# Patient Record
Sex: FEMALE | Race: WHITE | Employment: OTHER | URBAN - METROPOLITAN AREA
[De-identification: names, ages, dates, MRNs, and addresses within clinical notes are randomized per-mention and may not be internally consistent; named-entity substitution may affect disease eponyms.]

---

## 2018-11-16 ENCOUNTER — HOSPITAL ENCOUNTER (EMERGENCY)
Age: 38
Discharge: HOME | End: 2018-11-16
Attending: EMERGENCY MEDICINE
Payer: MEDICARE

## 2018-11-16 VITALS
BODY MASS INDEX: 29.62 KG/M2 | TEMPERATURE: 97.9 F | SYSTOLIC BLOOD PRESSURE: 112 MMHG | RESPIRATION RATE: 16 BRPM | DIASTOLIC BLOOD PRESSURE: 83 MMHG | HEART RATE: 92 BPM | OXYGEN SATURATION: 100 % | WEIGHT: 200 LBS | HEIGHT: 69 IN

## 2018-11-16 DIAGNOSIS — K02.9 DENTAL CARIES: ICD-10-CM

## 2018-11-16 DIAGNOSIS — K04.01 PULPITIS: Primary | ICD-10-CM

## 2018-11-16 PROCEDURE — 74011250637 HC RX REV CODE- 250/637: Performed by: EMERGENCY MEDICINE

## 2018-11-16 PROCEDURE — 96374 THER/PROPH/DIAG INJ IV PUSH: CPT

## 2018-11-16 PROCEDURE — 74011250636 HC RX REV CODE- 250/636: Performed by: EMERGENCY MEDICINE

## 2018-11-16 PROCEDURE — 99284 EMERGENCY DEPT VISIT MOD MDM: CPT

## 2018-11-16 PROCEDURE — 74011000250 HC RX REV CODE- 250: Performed by: EMERGENCY MEDICINE

## 2018-11-16 PROCEDURE — 75810000283 HC INJECTION NERVE BLOCK

## 2018-11-16 RX ORDER — DIPHENHYDRAMINE HYDROCHLORIDE 50 MG/ML
50 INJECTION, SOLUTION INTRAMUSCULAR; INTRAVENOUS
Status: COMPLETED | OUTPATIENT
Start: 2018-11-16 | End: 2018-11-16

## 2018-11-16 RX ORDER — ACETAMINOPHEN 500 MG
1000 TABLET ORAL ONCE
Status: COMPLETED | OUTPATIENT
Start: 2018-11-16 | End: 2018-11-16

## 2018-11-16 RX ORDER — LIDOCAINE HYDROCHLORIDE 10 MG/ML
10 INJECTION, SOLUTION EPIDURAL; INFILTRATION; INTRACAUDAL; PERINEURAL ONCE
Status: COMPLETED | OUTPATIENT
Start: 2018-11-16 | End: 2018-11-16

## 2018-11-16 RX ORDER — IBUPROFEN 200 MG
200 TABLET ORAL
COMMUNITY
End: 2018-12-08

## 2018-11-16 RX ORDER — LIDOCAINE HYDROCHLORIDE 20 MG/ML
15 SOLUTION OROPHARYNGEAL ONCE
Status: COMPLETED | OUTPATIENT
Start: 2018-11-16 | End: 2018-11-16

## 2018-11-16 RX ADMIN — LIDOCAINE HYDROCHLORIDE 15 ML: 20 SOLUTION ORAL; TOPICAL at 09:49

## 2018-11-16 RX ADMIN — LIDOCAINE HYDROCHLORIDE 5 ML: 10 INJECTION, SOLUTION EPIDURAL; INFILTRATION; INTRACAUDAL; PERINEURAL at 09:50

## 2018-11-16 RX ADMIN — ACETAMINOPHEN 1000 MG: 500 TABLET, FILM COATED ORAL at 09:48

## 2018-11-16 RX ADMIN — DIPHENHYDRAMINE HYDROCHLORIDE 50 MG: 50 INJECTION, SOLUTION INTRAMUSCULAR; INTRAVENOUS at 09:49

## 2018-11-16 NOTE — ED PROVIDER NOTES
EMERGENCY DEPARTMENT HISTORY AND PHYSICAL EXAM 
 
9:12 AM 
 
 
Date: 11/16/2018 Patient Name: Darshana Berry History of Presenting Illness Chief Complaint Patient presents with  Dental Pain  Ear Pain History Provided By: Patient Additional History (Context): Darshana Berry is a 40 y.o. female with PMHx of Asthma, and Open-Heart surgery at 13years old (per patient) who presents with  worsening throbbing 10/10 left-sided facial pain onset 3 days ago. Patient reports associated symptoms of left-sided dental pain, radiating to the left ear and head. Reports the use of Tylenol, which provided no alleviation in her presentation. Last dose of Tylenol was 3 days ago. Patient reports the use of 3 bottles of Ibuprofen, containing 36 pills (200mg) in each bottle, which provided alleviation until 1 day ago. Reports last dose of Ibuprofen at 4AM today (4 tablets of 200mg). Reports hx of tooth extraction 8 years ago. Reports she has not seen a dentist secondary to financial reasons. States, \"I know there's a hole in it, I just can't see it. \" No other concerns were expressed at this time. PCP: None Current Outpatient Medications Medication Sig Dispense Refill  ibuprofen (MOTRIN IB) 200 mg tablet Take 200 mg by mouth. Past History Past Medical History: 
Past Medical History:  
Diagnosis Date  Asthma Past Surgical History: 
Past Surgical History:  
Procedure Laterality Date  CARDIAC SURG PROCEDURE UNLIST Family History: 
History reviewed. No pertinent family history. Social History: 
Social History Tobacco Use  Smoking status: Current Every Day Smoker  Smokeless tobacco: Never Used Substance Use Topics  Alcohol use: Not on file  Drug use: Not on file Allergies: 
No Known Allergies Review of Systems Review of Systems Constitutional: Negative for fever. HENT: Positive for dental problem and ear pain (left ). (+) Left facial pain Neurological: Positive for headaches. All other systems reviewed and are negative. Physical Exam  
 
Visit Vitals /83 (BP 1 Location: Right arm, BP Patient Position: At rest) Pulse 92 Temp 97.9 °F (36.6 °C) Resp 16 Ht 5' 9\" (1.753 m) Wt 90.7 kg (200 lb) LMP 10/23/2018 SpO2 100% BMI 29.53 kg/m² Physical Exam  
Constitutional: She is oriented to person, place, and time. She appears well-developed and well-nourished. No distress. HENT:  
Head: Normocephalic and atraumatic. Right Ear: Tympanic membrane normal.  
Left Ear: Tympanic membrane normal.  
Mouth/Throat: Mucous membranes are normal. Dental caries present. Pain over left maxilla Dental benji in tooth 15 Pain reproduced at tooth 15 by tapping No pain along the mandible No swelling of the angular mandible Eyes: Pupils are equal, round, and reactive to light. Neck: Normal range of motion. Neck supple. Cardiovascular: Normal rate, regular rhythm, normal heart sounds and intact distal pulses. Exam reveals no gallop and no friction rub. No murmur heard. 2+ radial pulses bilaterally. No BLE edema. Pulmonary/Chest: Effort normal. No respiratory distress. She has wheezes. Coarse breath sounds Bilateral expiratory wheezes Abdominal: Soft. Bowel sounds are normal. She exhibits no distension. There is no tenderness. Musculoskeletal: Normal range of motion. Lymphadenopathy:  
No lymphadenopathy. Neurological: She is alert and oriented to person, place, and time. No cranial nerve deficit. Skin: Skin is warm and dry. No skin changes Nursing note and vitals reviewed. Diagnostic Study Results Labs - No results found for this or any previous visit (from the past 12 hour(s)). Radiologic Studies - No orders to display Medical Decision Making I am the first provider for this patient. I reviewed the vital signs, available nursing notes, past medical history, past surgical history, family history and social history. Vital Signs-Reviewed the patient's vital signs. Records Reviewed: Nursing Notes and Triage notes ED Course: Progress Notes, Reevaluation, and Consults: 
 
Provider Notes (Medical Decision Making):  
 
Procedures: Nerve Block Date/Time: 11/16/2018 10:23 AM 
Performed by: Konstantin Morales MD 
Authorized by: Konstantin Morales MD  
 
Consent:  
  Consent obtained:  Verbal 
  Consent given by:  Patient Risks discussed: Allergic reaction, infection, nerve damage, unsuccessful block, pain, swelling, intravenous injection and bleeding Alternatives discussed:  No treatment and delayed treatment Indications:  
  Indications:  Pain relief Location:  
  Nerve block body site: Infra-orbital and apical region over tooth 15. Procedure details (see MAR for exact dosages): Block needle gauge:  27 G Anesthetic injected:  Lidocaine 1% w/o epi Post-procedure details:  
  Dressing:  None Patient tolerance of procedure: Tolerated well, no immediate complications MDM Number of Diagnoses or Management Options Diagnosis management comments: Diff dx: tooth abscess, acute pulpitis, chronic pulpitis Pt has 3d of tooth pain radiating to ear, has not been able to go to dentist 2/2 money. No fever, obvious dental marin in tooth 15, will provide dental block, dental balls. Took 36 200mg ibuprofen each day for past 3d, that works out to [id number] which is below toxic dose of 400mg/kg, no need for poison center or labs, counseled on not taking that much, adding tylenol. Given list of free dental providers, safe for dc Safe for d/c, careful return precautions given. Pt/family comfortable with plan Sera Bass MD 
 
 
 
 
 
Diagnosis Clinical Impression: 1. Pulpitis 2. Dental caries Disposition: home Follow-up Information Follow up With Specialties Details Why Contact Info [id number] [address] [facility]) [phone] Medication List  
  
ASK your doctor about these medications MOTRIN  mg tablet Generic drug:  ibuprofen 
  
  
 
_______________________________ Attestations: 
Scribe Attestation Casey Ramos acting as a scribe for and in the presence of Jesi Christianson MD     
November 16, 2018 at 9:12 AM 
    
Provider Attestation:     
I personally performed the services described in the documentation, reviewed the documentation, as recorded by the scribe in my presence, and it accurately and completely records my words and actions. November 16, 2018 at 9:12 AM - Jesi Christianson MD   
__________________ 
_____________

## 2018-11-16 NOTE — ED TRIAGE NOTES
Left sided facial pain x 3 days. Dental into left ear. Arrived by EMS. Took 3 bottles of 36 pills of motrin over last 3 days

## 2018-11-16 NOTE — LETTER
[facility] EMERGENCY DEPT 
[facility] [id number] 
[phone] Work/School Note Date: 11/16/2018 To Whom It May concern: Ellie Claire was seen and treated today in the emergency room by the following provider(s): 
Attending Provider: Elbert Sher MD.   
 
Ellie Claire may return to work on 11/17/18.  
 
Sincerely, 
 
 
 
 
Mary Anne Mahmood MD

## 2018-11-16 NOTE — DISCHARGE INSTRUCTIONS
Tooth Decay: Care Instructions  Your Care Instructions    Tooth decay is damage to a tooth caused by plaque. Plaque is a thin film of bacteria that sticks to the teeth above and below the gum line. If plaque isn't removed from the teeth, it can build up and harden into tartar. The bacteria in plaque and tartar use sugars in food to make acids. These acids can cause tooth decay and gum disease. Any part of your tooth can decay, from the roots below the gum line to the chewing surface. Decay can affect the outer layer (enamel) or inner layer (dentin) of your teeth. The deeper the decay, the worse the damage. Untreated tooth decay will get worse and may lead to tooth loss. If you have a small hole (cavity) in your tooth, your dentist can repair it by removing the decay and filling the hole. If you have deeper decay, you may need more treatment. A very badly damaged tooth may have to be removed. Follow-up care is a key part of your treatment and safety. Be sure to make and go to all appointments, and call your dentist if you are having problems. It's also a good idea to know your test results and keep a list of the medicines you take. How can you care for yourself at home? If you have pain:  · Take an over-the-counter pain medicine, such as acetaminophen (Tylenol), ibuprofen (Advil, Motrin), or naproxen (Aleve). Be safe with medicines. Read and follow all instructions on the label. ? Do not take two or more pain medicines at the same time unless the doctor told you to. Many pain medicines have acetaminophen, which is Tylenol. Too much acetaminophen (Tylenol) can be harmful. · Put ice or a cold pack on your cheek over the tooth for 10 to 15 minutes at a time. Put a thin cloth between the ice and your skin. To prevent tooth decay  · Brush teeth twice a day, and floss once a day. Brushing with fluoride toothpaste and flossing may be enough to reverse early decay.   · Use a toothbrush with soft, rounded-end bristles and a head that is small enough to reach all parts of your teeth and mouth. Replace your toothbrush every 3 or 4 months. You may also use an electric toothbrush that has rotating and oscillating (back-and-forth) action. · Ask your dentist about having fluoride treatments at the dental office. · Brush your tongue to help get rid of bacteria. · Eat healthy foods that include whole grains, vegetables, and fruits. · Have your teeth cleaned by a professional at least two times a year. · Do not smoke or use smokeless tobacco. Tobacco can make tooth decay worse. When should you call for help? Call 911 anytime you think you may need emergency care. For example, call if:    · You have trouble breathing.    Call your dentist now or seek immediate medical care if:    · You have new or worse symptoms of infection, such as:  ? Increased pain, swelling, warmth, or redness. ? Red streaks leading from the area. ? Pus draining from the area. ? A fever.    Watch closely for changes in your health, and be sure to contact your doctor if:    · You do not get better as expected. Where can you learn more? Go to http://fany-ileana.info/. Enter T503 in the search box to learn more about \"Tooth Decay: Care Instructions. \"  Current as of: March 28, 2018  Content Version: 11.8  © 6701-9412 [employer]. Care instructions adapted under license by [employer] (which disclaims liability or warranty for this information). If you have questions about a medical condition or this instruction, always ask your healthcare professional. [address] [place] [address] any warranty or liability for your use of this information.

## 2018-12-08 ENCOUNTER — APPOINTMENT (OUTPATIENT)
Dept: GENERAL RADIOLOGY | Age: 38
DRG: 202 | End: 2018-12-08
Attending: NURSE PRACTITIONER
Payer: MEDICARE

## 2018-12-08 ENCOUNTER — HOSPITAL ENCOUNTER (INPATIENT)
Age: 38
LOS: 4 days | Discharge: HOME | DRG: 202 | End: 2018-12-12
Attending: EMERGENCY MEDICINE | Admitting: HOSPITALIST
Payer: MEDICARE

## 2018-12-08 DIAGNOSIS — R06.2 WHEEZING: ICD-10-CM

## 2018-12-08 DIAGNOSIS — J45.51 SEVERE PERSISTENT ASTHMA WITH ACUTE EXACERBATION: Primary | ICD-10-CM

## 2018-12-08 DIAGNOSIS — R06.02 SOB (SHORTNESS OF BREATH): ICD-10-CM

## 2018-12-08 PROBLEM — J45.901 ASTHMA EXACERBATION: Status: ACTIVE | Noted: 2018-12-08

## 2018-12-08 LAB
ANION GAP SERPL CALC-SCNC: 8 MMOL/L (ref 3–18)
BASOPHILS # BLD: 0.1 K/UL (ref 0–0.1)
BASOPHILS NFR BLD: 0 % (ref 0–2)
BUN SERPL-MCNC: 11 MG/DL (ref 7–18)
BUN/CREAT SERPL: 15 (ref 12–20)
CALCIUM SERPL-MCNC: 9.2 MG/DL (ref 8.5–10.1)
CHLORIDE SERPL-SCNC: 103 MMOL/L (ref 100–108)
CO2 SERPL-SCNC: 24 MMOL/L (ref 21–32)
CREAT SERPL-MCNC: 0.72 MG/DL (ref 0.6–1.3)
DIFFERENTIAL METHOD BLD: ABNORMAL
EOSINOPHIL # BLD: 0.4 K/UL (ref 0–0.4)
EOSINOPHIL NFR BLD: 3 % (ref 0–5)
ERYTHROCYTE [DISTWIDTH] IN BLOOD BY AUTOMATED COUNT: 15.1 % (ref 11.6–14.5)
GLUCOSE SERPL-MCNC: 94 MG/DL (ref 74–99)
HCT VFR BLD AUTO: 46 % (ref 35–45)
HGB BLD-MCNC: 15.4 G/DL (ref 12–16)
LYMPHOCYTES # BLD: 2.4 K/UL (ref 0.9–3.6)
LYMPHOCYTES NFR BLD: 21 % (ref 21–52)
MAGNESIUM SERPL-MCNC: 2.1 MG/DL (ref 1.6–2.6)
MCH RBC QN AUTO: 29.4 PG (ref 24–34)
MCHC RBC AUTO-ENTMCNC: 33.5 G/DL (ref 31–37)
MCV RBC AUTO: 87.8 FL (ref 74–97)
MONOCYTES # BLD: 1.1 K/UL (ref 0.05–1.2)
MONOCYTES NFR BLD: 9 % (ref 3–10)
NEUTS SEG # BLD: 7.7 K/UL (ref 1.8–8)
NEUTS SEG NFR BLD: 67 % (ref 40–73)
PLATELET # BLD AUTO: 367 K/UL (ref 135–420)
PMV BLD AUTO: 10.9 FL (ref 9.2–11.8)
POTASSIUM SERPL-SCNC: 3.7 MMOL/L (ref 3.5–5.5)
RBC # BLD AUTO: 5.24 M/UL (ref 4.2–5.3)
SODIUM SERPL-SCNC: 135 MMOL/L (ref 136–145)
WBC # BLD AUTO: 11.6 K/UL (ref 4.6–13.2)

## 2018-12-08 PROCEDURE — 74011250636 HC RX REV CODE- 250/636: Performed by: FAMILY MEDICINE

## 2018-12-08 PROCEDURE — 74011000250 HC RX REV CODE- 250: Performed by: NURSE PRACTITIONER

## 2018-12-08 PROCEDURE — 71045 X-RAY EXAM CHEST 1 VIEW: CPT

## 2018-12-08 PROCEDURE — 5A09357 ASSISTANCE WITH RESPIRATORY VENTILATION, LESS THAN 24 CONSECUTIVE HOURS, CONTINUOUS POSITIVE AIRWAY PRESSURE: ICD-10-PCS | Performed by: FAMILY MEDICINE

## 2018-12-08 PROCEDURE — 99218 HC RM OBSERVATION: CPT

## 2018-12-08 PROCEDURE — 74011250636 HC RX REV CODE- 250/636: Performed by: NURSE PRACTITIONER

## 2018-12-08 PROCEDURE — 83735 ASSAY OF MAGNESIUM: CPT

## 2018-12-08 PROCEDURE — 96365 THER/PROPH/DIAG IV INF INIT: CPT

## 2018-12-08 PROCEDURE — 74011250637 HC RX REV CODE- 250/637: Performed by: FAMILY MEDICINE

## 2018-12-08 PROCEDURE — 96375 TX/PRO/DX INJ NEW DRUG ADDON: CPT

## 2018-12-08 PROCEDURE — 85025 COMPLETE CBC W/AUTO DIFF WBC: CPT

## 2018-12-08 PROCEDURE — 96366 THER/PROPH/DIAG IV INF ADDON: CPT

## 2018-12-08 PROCEDURE — 94660 CPAP INITIATION&MGMT: CPT

## 2018-12-08 PROCEDURE — 80048 BASIC METABOLIC PNL TOTAL CA: CPT

## 2018-12-08 PROCEDURE — 77030037878 HC DRSG MEPILEX >48IN BORD MOLN -B

## 2018-12-08 PROCEDURE — 99285 EMERGENCY DEPT VISIT HI MDM: CPT

## 2018-12-08 RX ORDER — IBUPROFEN 200 MG
1 TABLET ORAL DAILY
Status: DISCONTINUED | OUTPATIENT
Start: 2018-12-09 | End: 2018-12-12 | Stop reason: HOSPADM

## 2018-12-08 RX ORDER — MAGNESIUM SULFATE HEPTAHYDRATE 40 MG/ML
2 INJECTION, SOLUTION INTRAVENOUS ONCE
Status: COMPLETED | OUTPATIENT
Start: 2018-12-08 | End: 2018-12-08

## 2018-12-08 RX ORDER — ACETAMINOPHEN 325 MG/1
650 TABLET ORAL
Status: DISCONTINUED | OUTPATIENT
Start: 2018-12-08 | End: 2018-12-12 | Stop reason: HOSPADM

## 2018-12-08 RX ORDER — ALBUTEROL SULFATE 0.83 MG/ML
5 SOLUTION RESPIRATORY (INHALATION)
Status: COMPLETED | OUTPATIENT
Start: 2018-12-08 | End: 2018-12-08

## 2018-12-08 RX ORDER — MORPHINE SULFATE 2 MG/ML
1 INJECTION, SOLUTION INTRAMUSCULAR; INTRAVENOUS ONCE
Status: COMPLETED | OUTPATIENT
Start: 2018-12-08 | End: 2018-12-08

## 2018-12-08 RX ORDER — IPRATROPIUM BROMIDE AND ALBUTEROL SULFATE 2.5; .5 MG/3ML; MG/3ML
3 SOLUTION RESPIRATORY (INHALATION)
Status: COMPLETED | OUTPATIENT
Start: 2018-12-08 | End: 2018-12-08

## 2018-12-08 RX ORDER — ONDANSETRON 2 MG/ML
4 INJECTION INTRAMUSCULAR; INTRAVENOUS
Status: DISCONTINUED | OUTPATIENT
Start: 2018-12-08 | End: 2018-12-12 | Stop reason: HOSPADM

## 2018-12-08 RX ORDER — FAMOTIDINE 20 MG/1
20 TABLET, FILM COATED ORAL DAILY
Status: DISCONTINUED | OUTPATIENT
Start: 2018-12-08 | End: 2018-12-12 | Stop reason: HOSPADM

## 2018-12-08 RX ORDER — IPRATROPIUM BROMIDE AND ALBUTEROL SULFATE 2.5; .5 MG/3ML; MG/3ML
3 SOLUTION RESPIRATORY (INHALATION)
Status: DISCONTINUED | OUTPATIENT
Start: 2018-12-09 | End: 2018-12-10

## 2018-12-08 RX ADMIN — IPRATROPIUM BROMIDE AND ALBUTEROL SULFATE 3 ML: .5; 3 SOLUTION RESPIRATORY (INHALATION) at 18:27

## 2018-12-08 RX ADMIN — ALBUTEROL SULFATE 5 MG: 2.5 SOLUTION RESPIRATORY (INHALATION) at 17:19

## 2018-12-08 RX ADMIN — FAMOTIDINE 20 MG: 20 TABLET ORAL at 22:07

## 2018-12-08 RX ADMIN — MORPHINE SULFATE 1 MG: 2 INJECTION, SOLUTION INTRAMUSCULAR; INTRAVENOUS at 21:01

## 2018-12-08 RX ADMIN — IPRATROPIUM BROMIDE AND ALBUTEROL SULFATE 3 ML: .5; 3 SOLUTION RESPIRATORY (INHALATION) at 19:32

## 2018-12-08 RX ADMIN — METHYLPREDNISOLONE SODIUM SUCCINATE 125 MG: 125 INJECTION, POWDER, FOR SOLUTION INTRAMUSCULAR; INTRAVENOUS at 17:13

## 2018-12-08 RX ADMIN — MAGNESIUM SULFATE HEPTAHYDRATE 2 G: 40 INJECTION, SOLUTION INTRAVENOUS at 17:13

## 2018-12-08 RX ADMIN — ACETAMINOPHEN 650 MG: 325 TABLET, FILM COATED ORAL at 22:06

## 2018-12-08 NOTE — ED TRIAGE NOTES
Pt has been wheezing x 3 days. Pt run out of her inhaler. Was given a combi and albuterol treatment by EMS.

## 2018-12-09 LAB
ERYTHROCYTE [DISTWIDTH] IN BLOOD BY AUTOMATED COUNT: 15.3 % (ref 11.6–14.5)
HCT VFR BLD AUTO: 42.9 % (ref 35–45)
HGB BLD-MCNC: 14.2 G/DL (ref 12–16)
MCH RBC QN AUTO: 29.2 PG (ref 24–34)
MCHC RBC AUTO-ENTMCNC: 33.1 G/DL (ref 31–37)
MCV RBC AUTO: 88.1 FL (ref 74–97)
PLATELET # BLD AUTO: 370 K/UL (ref 135–420)
PMV BLD AUTO: 11.2 FL (ref 9.2–11.8)
RBC # BLD AUTO: 4.87 M/UL (ref 4.2–5.3)
WBC # BLD AUTO: 6.5 K/UL (ref 4.6–13.2)

## 2018-12-09 PROCEDURE — 85027 COMPLETE CBC AUTOMATED: CPT

## 2018-12-09 PROCEDURE — 74011000250 HC RX REV CODE- 250: Performed by: FAMILY MEDICINE

## 2018-12-09 PROCEDURE — 74011000250 HC RX REV CODE- 250: Performed by: INTERNAL MEDICINE

## 2018-12-09 PROCEDURE — 99218 HC RM OBSERVATION: CPT

## 2018-12-09 PROCEDURE — 36415 COLL VENOUS BLD VENIPUNCTURE: CPT

## 2018-12-09 PROCEDURE — 94640 AIRWAY INHALATION TREATMENT: CPT

## 2018-12-09 PROCEDURE — 65270000029 HC RM PRIVATE

## 2018-12-09 PROCEDURE — 94660 CPAP INITIATION&MGMT: CPT

## 2018-12-09 PROCEDURE — 77030032490 HC SLV COMPR SCD KNE COVD -B

## 2018-12-09 PROCEDURE — 74011636637 HC RX REV CODE- 636/637: Performed by: FAMILY MEDICINE

## 2018-12-09 PROCEDURE — 74011250637 HC RX REV CODE- 250/637: Performed by: FAMILY MEDICINE

## 2018-12-09 PROCEDURE — 77030029684 HC NEB SM VOL KT MONA -A

## 2018-12-09 RX ORDER — GUAIFENESIN 600 MG/1
1200 TABLET, EXTENDED RELEASE ORAL 2 TIMES DAILY
Status: COMPLETED | OUTPATIENT
Start: 2018-12-09 | End: 2018-12-12

## 2018-12-09 RX ORDER — IPRATROPIUM BROMIDE AND ALBUTEROL SULFATE 2.5; .5 MG/3ML; MG/3ML
3 SOLUTION RESPIRATORY (INHALATION)
Status: DISCONTINUED | OUTPATIENT
Start: 2018-12-09 | End: 2018-12-10

## 2018-12-09 RX ADMIN — IPRATROPIUM BROMIDE AND ALBUTEROL SULFATE 3 ML: .5; 3 SOLUTION RESPIRATORY (INHALATION) at 12:14

## 2018-12-09 RX ADMIN — IPRATROPIUM BROMIDE AND ALBUTEROL SULFATE 3 ML: .5; 3 SOLUTION RESPIRATORY (INHALATION) at 19:46

## 2018-12-09 RX ADMIN — IPRATROPIUM BROMIDE AND ALBUTEROL SULFATE 3 ML: .5; 3 SOLUTION RESPIRATORY (INHALATION) at 10:23

## 2018-12-09 RX ADMIN — ACETAMINOPHEN 650 MG: 325 TABLET, FILM COATED ORAL at 02:58

## 2018-12-09 RX ADMIN — GUAIFENESIN 1200 MG: 600 TABLET, EXTENDED RELEASE ORAL at 21:05

## 2018-12-09 RX ADMIN — IPRATROPIUM BROMIDE AND ALBUTEROL SULFATE 3 ML: .5; 3 SOLUTION RESPIRATORY (INHALATION) at 07:31

## 2018-12-09 RX ADMIN — PREDNISONE 50 MG: 20 TABLET ORAL at 10:29

## 2018-12-09 RX ADMIN — IPRATROPIUM BROMIDE AND ALBUTEROL SULFATE 3 ML: .5; 3 SOLUTION RESPIRATORY (INHALATION) at 15:33

## 2018-12-09 NOTE — PROGRESS NOTES
Called to place patient on Bipap. I recommended CPAP due to oxygenation issues. SP02 improved frome 93% to 95%. Patient appears comfortable and in no distress. CPAP 10 and 60%.

## 2018-12-09 NOTE — ED NOTES
Gave turnover to [place], ICU RN, Pt transported on NRB, and then placed back on CPAP in ICU. Vital signs remained stable.

## 2018-12-09 NOTE — ED NOTES
Bedside and Verbal shift change report given to Tim Ortiz RN (oncoming nurse) by Gloria Engle RN (offgoing nurse). Report included the following information SBAR and ED Summary.

## 2018-12-09 NOTE — PROGRESS NOTES
1710  -- TRANSFER - IN REPORT: 
 
Pt being received from ICU 2600  (unit) for routine progression of care Report consisted of patients Situation, Background, Assessment and  
Recommendations(SBAR). Information from the following report(s) SBAR, Procedure Summary, MAR and Recent Results was reviewed with the receiving nurse. Opportunity for questions and clarification was provided. 1900 -- Bedside, Verbal and Written shift change report given to Rusty Solomon 1935 (oncoming nurse) by Quinn Masterson nurse). Report included the following information SBAR, Kardex, Intake/Output, MAR and Recent Results.

## 2018-12-09 NOTE — PROGRESS NOTES
Problem: Falls - Risk of 
Goal: *Absence of Falls Document Lynnette Diaz Fall Risk and appropriate interventions in the flowsheet. Outcome: Progressing Towards Goal 
Fall Risk Interventions: 
  
 
  
 
Medication Interventions: Patient to call before getting OOB Elimination Interventions: Call light in reach

## 2018-12-09 NOTE — PROGRESS NOTES
Non-skin footies applied to feet and assisted up to bedside commode to void without difficulty. Back to bed and resting quietly. VSS. No distress noted.

## 2018-12-09 NOTE — ED NOTES
Spoke with Dr. Vannesa Milner about pt SPO2 of 85-88% and he ordered NRB for pt with the possibility of bi-pap if pt oxygenation does not improve. Placed pt on 10L O2 by NRB; Pt SPO2 went up to 94%

## 2018-12-09 NOTE — H&P
Internal Medicine History and Physical 
   
 
 
Subjective HPI: Andree Keyes is a 41 y.o. female with a PMHx of who presented to the ED with SOB and intermittent cough. She has a hx of asthma and she had wheezing for the last 2d. Today sx got worse and she was noted by miguel angel to have perioral cyanosis and increased respiratory effort. She was brought to ED. ED evaluation revealed a patient with asthma exacerbation, CXR negative, labs unremarkable, response to nebs, Solu-Medrol and Mg sulphate suboptimal with persistent wheezing and still increased respiratory effort. Will admit for further treatment and observation. PMHx: 
Past Medical History:  
Diagnosis Date  Asthma PSurgHx: 
Past Surgical History:  
Procedure Laterality Date  CARDIAC SURG PROCEDURE UNLIST SocialHx: 
Social History Socioeconomic History  Marital status: SINGLE Spouse name: Not on file  Number of children: Not on file  Years of education: Not on file  Highest education level: Not on file Social Needs  Financial resource strain: Not on file  Food insecurity - worry: Not on file  Food insecurity - inability: Not on file  Transportation needs - medical: Not on file  Transportation needs - non-medical: Not on file Occupational History  Not on file Tobacco Use  Smoking status: Current Every Day Smoker  Smokeless tobacco: Never Used Substance and Sexual Activity  Alcohol use: Not on file  Drug use: Not on file  Sexual activity: Not on file Other Topics Concern  Not on file Social History Narrative  Not on file Allergies: Allergies Allergen Reactions  Morphine Cough  Penicillins Rash FamilyHx: 
History reviewed. No pertinent family history. None Review of Systems: 
CONST: fever(-),   chills(-),   fatigue(-),   malaise(-) SKIN: itching(-),   rash(-) EYES: vision - no change from baseline ENT: ear pain(-),   nasal discharge(-),   sore throat(-),   voice change(-) RESP: SOB(+),   Cough(+),   hemoptysis(-) CV: chest pain(-),   PND(-),   orthopnea(-),   exertional dyspnea(-),   palpitations(-), syncope(-) 
GI: abd pain(-),   nausea(-),   vomiting(-),   diarrhea(-),   melena(-),   hematemesis(-), hematochesia(-) 
: dysuria(-),   frequency(-),   urgency(-),   hematuria(-) 
MS: arthralgias(-),   myalgias(-) NEURO: speech w/o change,   tremors(-),   seizures(-),   numbness(-),   dizziness(-) Objective Visit Vitals /56 Pulse 79 Temp 97.8 °F (36.6 °C) Resp 18 Ht 5' 9\" (1.753 m) Wt 99.8 kg (220 lb) SpO2 (!) 88% BMI 32.49 kg/m² Physical Exam: 
CONST: NAD,   VSS reviewed SKIN: rashes(-),   ulcers(-) EYES: PERRL,   EOMI,   sclerae w/o jaundice HENT: HEENT,   normal appearing nose and ears,   normal nasal mucosa and turbinates NECK: supple,   no LA,   trachea is midline,   thyroid w/o goiter or palpable nodules RESP: increased respiratory effort,   Wheezes(+),   rales(-),   rhochi(-),   no consolidation on percussion CHEST: normal axillae,   retractions(-),   use of accessory muscles(+) CV: JVD(-),   carotid bruits(-),   RRR,   gallops(-),   murmurs(-),   edema LE(-),   abd bruits(-),   peripheral pulses normal 
ABD: soft, tender(-),   distended(-),   HSM(-),   BS(+) in all quadrants,   peritoneal signs(-) 
MS: ROM(-) in all extremities,  clubbing(-),   peripheral cyanosis(-) NEURO: speech normal, tremors(-),   CN II-XII(-),   lateralizing signs(-) PSYCH: AOC x 3,   appropriate affect,   non-suicidal 
 
 
Laboratory Studies: All lab results for the last 24 hours reviewed. Imaging Reviewed: 
[facility] Result Date: 12/8/2018 EXAM: CHEST RADIOGRAPH CLINICAL INDICATION/HISTORY: SOB -Additional: Wheezing COMPARISON: None TECHNIQUE: Portable frontal view of the chest _______________ FINDINGS: SUPPORT DEVICES: Patient status post sternotomy. HEART AND MEDIASTINUM: Heart size normal. There is a density in the right cardiophrenic angle. This could represent a prominent cardiophrenic fat pad. LUNGS AND PLEURAL SPACES: Clear. No consolidation, mass or effusion. BONY THORAX AND SOFT TISSUES: Unremarkable. _______________ IMPRESSION: No active cardiopulmonary disease. EKG:  
normal EKG, normal sinus rhythm. Assessment/Plan Active Hospital Problems Diagnosis Date Noted  Asthma exacerbation 12/08/2018  
 
 
- Cont acceptable home medications for chronic conditions  
- DVT protocol and GI prophylaxis I have personally reviewed all pertinent labs, films and EKGs that have officially resulted. I reviewed available electronic documentation outlining the initial presentation as well as the emergency room physician's encounter. Total time spent with patient and chart review, orders and documentation - 45min out of which more than 50% spent face-to-face with patient. Cassie Thurman MD  
12/8/2018  
8:17 PM 
 
 
[id number] [facility]

## 2018-12-09 NOTE — ED NOTES
Spoke with Dr. Joss Rowland, pt still struggling to get enough air, and work of breathing has increased, bi-pap going to be ordered

## 2018-12-09 NOTE — PROGRESS NOTES
Problem: Falls - Risk of 
Goal: *Absence of Falls Document Hoa Zarco Fall Risk and appropriate interventions in the flowsheet. Outcome: Progressing Towards Goal 
Fall Risk Interventions: 
  
 
  
 
Medication Interventions: Bed/chair exit alarm, Patient to call before getting OOB, Teach patient to arise slowly Elimination Interventions: Bed/chair exit alarm, Call light in reach, Patient to call for help with toileting needs, Toilet paper/wipes in reach

## 2018-12-09 NOTE — CONSULTS
[facility]  Pulmonary, Critical Care, and Sleep Medicine    Name: Tara Bright MRN: [id number]   : 1980 Hospital: [facility]   Date: 2018        Critical Care Initial Patient Consult      IMPRESSION:   - Acute asthma exacerbation. Patient Active Problem List   Diagnosis Code    Asthma exacerbation J45. 901        RECOMMENDATIONS:   PLAN:   - Supplemental oxygenation to keep SpO2>90%. - Patient has significantly improved on NIV and I will continue BiPAP for now. - She has certainly much improved at this time and intubation/mechanical ventilation is not indicated. - Inhaled beta agonists: Continue current nebulized bronchodilators. - Inhaled anticholinergics: Continue duo-nebz. - Systemic steroids: Agree with current oral dose of prednisone. - Will continue steroids for a total of 7 days.   - No indication for abx.   - Can consider LTRA as there is an element of use of NSAIDs in the medical history. - Please avoid NSAIDs. Subjective/History: This patient has been seen and evaluated at the request of Dr. Cedric Steward for acute asthma exacerbation. 18    Patient is a 41 y.o. female with past medical history significant for childhood asthma who presented with worsening SOB for 3 days. Patient states that she recently moved to [place] from [place] where she had lived most of her life. She was diagnosed with asthma when she was 1 months old and since then she has been having recurrent asthma exacerbations. She was once admitted to ICU and required mechanical ventilation when she was 16years old. Patient states that on this admission she has been having SOB and wheezing for the last 3 days and has run out of her inhaler. She has also been complaining of a non productive cough and chest tightness associated with asthma.  Today her fiancee noted that patient had developed perioral cyanosis and certainly an increased respiratory effort and hence she was brought to the ED. Patient was given nebulized bronchodilators, steroids and Magnesium SO4 in the ED and later on she was placed on NIV. Patient has improved on NIV and was then admitted to ICU. Patient states that she has been increasingly using NSAIDs for 3 weeks for toothache prior to admission. Right now patient feels much better compared to when she presented to ED. Past Medical History:   Diagnosis Date    Asthma       Past Surgical History:   Procedure Laterality Date    CARDIAC SURG PROCEDURE UNLIST        Prior to Admission medications    Not on File     Current Facility-Administered Medications   Medication Dose Route Frequency    albuterol-ipratropium (DUO-NEB) 2.5 MG-0.5 MG/3 ML  3 mL Nebulization QID RT    predniSONE (DELTASONE) tablet 50 mg  50 mg Oral DAILY WITH BREAKFAST    nicotine (NICODERM CQ) 14 mg/24 hr patch 1 Patch  1 Patch TransDERmal DAILY    famotidine (PEPCID) tablet 20 mg  20 mg Oral DAILY     Allergies   Allergen Reactions    Morphine Cough    Penicillins Rash      Social History     Tobacco Use    Smoking status: Current Every Day Smoker    Smokeless tobacco: Never Used   Substance Use Topics    Alcohol use: Not on file      History reviewed. No pertinent family history. Review of Systems:  Pertinent items are noted in HPI. Objective:   Vital Signs:    Visit Vitals  /70   Pulse 78   Temp 97.9 °F (36.6 °C)   Resp 26   Ht 5' 9\" (1.753 m)   Wt 99.8 kg (220 lb 0.3 oz)   SpO2 96%   BMI 32.49 kg/m²       O2 Device: CPAP mask   O2 Flow Rate (L/min): 2 l/min   Temp (24hrs), Av.8 °F (36.6 °C), Min:97.6 °F (36.4 °C), Max:97.9 °F (36.6 °C)       Intake/Output:   Last shift:       1901 -  0700  In: -   Out: 300 [Urine:300]  Last 3 shifts: No intake/output data recorded.     Intake/Output Summary (Last 24 hours) at 2018 0200  Last data filed at 2018 0058  Gross per 24 hour   Intake    Output 300 ml   Net -300 ml         Ventilator Settings:  Mode Rate Tidal Volume Pressure FiO2 PEEP            60 %       Peak airway pressure:      Minute ventilation: 8 l/min      ARDS network Guidelines:   Lung protective strategy and Plateau  Pressure goal < 30 cm H2O goals  Oxygenation Goals PaO2 55-80 mm Hg or SaO2 88-95%  PH goal 7.30-7.45    VAP bundle; if applicable. Reviewed. Tipton tube to suction at 20-30 cm Hg. Maintain Pascale tube with 5-10ml air every 4 hours  Routine oral care every 4 hours  Elevation of head > 45 degree  Daily sedation holiday and SBT evaluation starting at 6.00am.    Physical Exam:     General:  Alert, cooperative, no distress. Head:  Normocephalic, without obvious abnormality, atraumatic. Eyes:  Conjunctivae/corneas clear. PERRL,   Nose: Nares normal. Septum midline. Mucosa normal. No drainage or sinus tenderness. Throat: Lips, mucosa, and tongue normal. Teeth and gums normal.   Neck: Supple, symmetrical, trachea midline, no adenopathy, thyroid: no enlargment/tenderness/nodules, no carotid bruit and no JVD. Back:   Symmetric, no curvature. ROM normal.   Lungs:   Bilateral mild wheeze during inspiration. Chest wall:  No tenderness or deformity. Heart:  Regular rate and rhythm, S1, S2 normal, no murmur, click, rub or gallop. Abdomen:   Soft, non-tender. Bowel sounds normal. No masses,  No organomegaly. Extremities: Extremities normal, atraumatic, no cyanosis or edema. Pulses: 2+ and symmetric all extremities.    Skin: Skin color, texture, turgor normal. No rashes or lesions   Lymph nodes:      Cervical, supraclavicular, and axillary nodes normal.   Neurologic: Grossly nonfocal         Data:     Recent Results (from the past 24 hour(s))   CBC WITH AUTOMATED DIFF    Collection Time: 12/08/18  5:16 PM   Result Value Ref Range    WBC 11.6 4.6 - 13.2 K/uL    RBC 5.24 4.20 - 5.30 M/uL    HGB 15.4 12.0 - 16.0 g/dL    HCT 46.0 (H) 35.0 - 45.0 %    MCV 87.8 74.0 - 97.0 FL    MCH 29.4 24.0 - 34.0 PG    MCHC 33.5 31.0 - 37.0 g/dL    RDW 15.1 (H) 11.6 - 14.5 %    PLATELET 533 095 - 081 K/uL    MPV 10.9 9.2 - 11.8 FL    NEUTROPHILS 67 40 - 73 %    LYMPHOCYTES 21 21 - 52 %    MONOCYTES 9 3 - 10 %    EOSINOPHILS 3 0 - 5 %    BASOPHILS 0 0 - 2 %    ABS. NEUTROPHILS 7.7 1.8 - 8.0 K/UL    ABS. LYMPHOCYTES 2.4 0.9 - 3.6 K/UL    ABS. MONOCYTES 1.1 0.05 - 1.2 K/UL    ABS. EOSINOPHILS 0.4 0.0 - 0.4 K/UL    ABS. BASOPHILS 0.1 0.0 - 0.1 K/UL    DF AUTOMATED     METABOLIC PANEL, BASIC    Collection Time: 12/08/18  5:16 PM   Result Value Ref Range    Sodium 135 (L) 136 - 145 mmol/L    Potassium 3.7 3.5 - 5.5 mmol/L    Chloride 103 100 - 108 mmol/L    CO2 24 21 - 32 mmol/L    Anion gap 8 3.0 - 18 mmol/L    Glucose 94 74 - 99 mg/dL    BUN 11 7.0 - 18 MG/DL    Creatinine 0.72 0.6 - 1.3 MG/DL    BUN/Creatinine ratio 15 12 - 20      GFR est AA >60 >60 ml/min/1.73m2    GFR est non-AA >60 >60 ml/min/1.73m2    Calcium 9.2 8.5 - 10.1 MG/DL   MAGNESIUM    Collection Time: 12/08/18  5:16 PM   Result Value Ref Range    Magnesium 2.1 1.6 - 2.6 mg/dL           No results for input(s): FIO2I, IFO2, HCO3I, IHCO3, HCOPOC, PCO2I, PCOPOC, IPHI, PHI, PHPOC, PO2I, PO2POC in the last 72 hours. No lab exists for component: IPOC2  Telemetry:normal sinus rhythm    Imaging:  I have personally reviewed the patients radiographs and have reviewed the reports:  CXR Results  (Last 48 hours)               12/08/18 1656  XR CHEST PORT Final result    Impression:  IMPRESSION:       No active cardiopulmonary disease. Narrative:  EXAM: CHEST RADIOGRAPH       CLINICAL INDICATION/HISTORY: SOB   -Additional: Wheezing       COMPARISON: None       TECHNIQUE: Portable frontal view of the chest       _______________       FINDINGS:       SUPPORT DEVICES: Patient status post sternotomy. HEART AND MEDIASTINUM: Heart size normal. There is a density in the right   cardiophrenic angle. This could represent a prominent cardiophrenic fat pad.        LUNGS AND PLEURAL SPACES: Clear. No consolidation, mass or effusion. BONY THORAX AND SOFT TISSUES: Unremarkable.       _______________                Best practice :    Glycemic control  IHI ICU bundles:  Sress ulcer prophylaxis  DVT prophylaxis          Total of  52   min critical care time spent at bedside during the course of care providing evaluation,management and care decisions and ordering appropriate treatment related to critical care problems exclusive of procedures. The reason for providing this level of medical care for this critically ill patient was due a critical illness that impaired one or more vital organ systems such that there was a high probability of imminent or life threatening deterioration in the patients condition. This care involved high complexity decision making to assess, manipulate, and support vital system functions, to treat this degree vital organ system failure and to prevent further life threatening deterioration of the patients condition.     Hua Gill MD

## 2018-12-09 NOTE — ED PROVIDER NOTES
Delicia Kuo is a 41year old female who presents to the ED with a c/o SOB secondary to asthma eacerbation. Pt states she has been exacerbated all day. She admits requiring admission in the past, and has had to be intubated on one occasion. She  
 
 
 
  
 
Past Medical History:  
Diagnosis Date  Asthma Past Surgical History:  
Procedure Laterality Date  CARDIAC SURG PROCEDURE UNLIST History reviewed. No pertinent family history. Social History Socioeconomic History  Marital status: SINGLE Spouse name: Not on file  Number of children: Not on file  Years of education: Not on file  Highest education level: Not on file Social Needs  Financial resource strain: Not on file  Food insecurity - worry: Not on file  Food insecurity - inability: Not on file  Transportation needs - medical: Not on file  Transportation needs - non-medical: Not on file Occupational History  Not on file Tobacco Use  Smoking status: Current Every Day Smoker  Smokeless tobacco: Never Used Substance and Sexual Activity  Alcohol use: Not on file  Drug use: Not on file  Sexual activity: Not on file Other Topics Concern  Not on file Social History Narrative  Not on file ALLERGIES: Morphine and Penicillins Review of Systems Vitals:  
 12/08/18 1608 12/08/18 1900 BP: 123/79 101/57 Pulse: 79 Resp: 18 Temp: 97.8 °F (36.6 °C) SpO2: 96% (!) 89% Weight: 99.8 kg (220 lb) Height: 5' 9\" (1.753 m) Physical Exam  
Constitutional: She appears well-developed and well-nourished. No distress. HENT:  
Head: Normocephalic and atraumatic. Nose: Nose normal.  
Eyes: EOM are normal. Right eye exhibits no discharge. Left eye exhibits no discharge. No scleral icterus. Neck: Normal range of motion. Neck supple. No tracheal deviation present. Cardiovascular: Normal rate, regular rhythm and intact distal pulses. Pulmonary/Chest: Effort normal. No respiratory distress. She has wheezes (inspirastory and expiratory wheezing noted in all fields. ). Abdominal: Soft. She exhibits no distension. There is no tenderness. Genitourinary:  
Genitourinary Comments: NE 
  
Musculoskeletal: She exhibits no deformity. Neurological: She is alert. Coordination normal.  
Skin: Skin is warm and dry. NE. Psychiatric: She has a normal mood and affect. Her behavior is normal.  
Nursing note and vitals reviewed. MDM Number of Diagnoses or Management Options Diagnosis management comments: PROGRESS NOTE:  Plain film XR reviewed. No acute finding. Juan Hernandez NP  7:49 PM 
 
MEM:  Pt states she feels no better after multiple breathing treatments. Will seek admission for this Pt. Juan Hernandez NP  7:50 PM 
 
 
 
  
Amount and/or Complexity of Data Reviewed Clinical lab tests: reviewed and ordered Tests in the radiology section of CPT®: ordered and reviewed Discuss the patient with other providers: yes (Dr Danita Gutierrez,  Dr Randy Zapata. ) Independent visualization of images, tracings, or specimens: yes (Plain film XR 
) Risk of Complications, Morbidity, and/or Mortality Presenting problems: moderate Diagnostic procedures: moderate Management options: moderate Patient Progress Patient progress: stable Procedures Diagnosis: 1. Severe persistent asthma with acute exacerbation 2. Wheezing 3. SOB (shortness of breath) Disposition:   ADMITTED - DR Daugherty Follow-up Information None Medication List  
  
You have not been prescribed any medications.

## 2018-12-09 NOTE — PROGRESS NOTES
Chart reviewed and pt verified demographics. She recently moved here from [place] so her [place] is not active here, and now qualifies for [employer], and the [employer] are trying to get Medicaid to qualify her for Q and B . She has no PCP, will place  Consult. She lives with Priscilla Pelaez. He is unable to pick her up since they have no car at present. Her Mom is Dennis Mei [phone]. She does not have a nebulizer here, will need one at NH. Reason for Admission:   Asthma exacerbation RRAT Score:   4 Plan for utilizing home health: Will need home nebulizer and neb meds Likelihood of Readmission:  Mod/yellow- does not have PCP, does not have care to go to PCP, new to area Transition of Care Plan: Home with [place] nebulizer Staci Dupree Patient and/or next of kin has been given and has signed the [facility] Outpatient Observation  Notification letter and all questions answered. Copy of this notice given to patient and copy placed on chart. Patient and/or next of kin has been given the Outpatient Observation Information and Notification letter and all questions answered.

## 2018-12-09 NOTE — PROGRESS NOTES
Progress Note Patient: Yared Tavarez               Sex: female          DOA: 12/8/2018 YOB: 1980      Age:  41 y.o.        LOS:  LOS: 1 day CHIEF COMPLAINT:  Asthma exacerbation Subjective:  
 
Patient awake and alert She feels \"short of breath still. \" 
 
Objective:  
  
Visit Vitals /70 Pulse 91 Temp 97.8 °F (36.6 °C) Resp 24 Ht 5' 9\" (1.753 m) Wt 99.8 kg (220 lb 0.3 oz) SpO2 93% BMI 32.49 kg/m² Physical Exam: 
Gen:  Patient is in no distress. No complaint HEENT and Neck:  PERRL, No cervical tenderness or masses Lungs:  Wheeze bilaterally, no rales, adequate aeration Heart:  Regular Rate and Rhythm. No murmur or gallop. No JVD. No edema. Abdomen:  Soft, non tender, no masses, no Hepatosplenomegally Extremities:  No digital clubbing, no cyanosis Neuro:  Alert and oriented, Normal affect, Cranial nerves intact, No sensory deficits Lab/Data Reviewed: All lab results for the last 24 hours reviewed. Assessment/Plan Active Problems: 
  Asthma exacerbation (12/8/2018) Plan: 
Continue with nebulization treatments and steroids Supplemental oxygen Patient requests cough syrup.

## 2018-12-09 NOTE — PROGRESS NOTES
Please see my consult note from today: 
Patient has significantly improved since admission. She has been comfortable on BiPAP. Vitals have been stable. Chest examination still consistent with bilateral wheeze but she has good air entry bilaterally and not in any distress. Impression: 
Acute exacerbation of asthma - resolved. Plan: 
Continue steroids, nebulized bronchodilators. Please start the patient on supplemental oxygenation using NC. Discontinue BiPAP. Steroids taper over 10 days. Can be transferred to the floor. Signed By: Mamie Calvillo MD   
 December 9, 2018

## 2018-12-09 NOTE — ED NOTES
TRANSFER - ED to INPATIENT REPORT: 
 
SBAR report made available to receiving floor on this patient being transferred to [address] [place] [address] ICU [phone])  for change in patient condition(asthma and increased work of breathing) Admitting diagnosis Asthma exacerbation Information from the following report(s) SBAR, ED Summary, MAR, Recent Results, Med Rec Status and Cardiac Rhythm Sinus Tach was made available to receiving floor. Lines:  
Peripheral IV 12/08/18 Right Forearm (Active) Site Assessment Clean, dry, & intact 12/8/2018  5:23 PM  
Phlebitis Assessment 0 12/8/2018  5:23 PM  
Infiltration Assessment 0 12/8/2018  5:23 PM  
Dressing Status Clean, dry, & intact 12/8/2018  5:23 PM  
Dressing Type Transparent 12/8/2018  5:23 PM  
Hub Color/Line Status Patent; Flushed;Blue 12/8/2018  5:23 PM  
Action Taken Blood drawn 12/8/2018  5:23 PM  
  
 
Medication list confirmed with patient Opportunity for questions and clarification was provided. Patient is oriented to time, place, person and situation . Patient is  continent and ambulatory without assist 
  
Valuables transported with patient Patient transported with: 
 Monitor O2 @ 15 liters Registered Nurse Vitals w/ MEWS Score (last day) Date/Time MEWS Score Pulse Resp Temp BP Level of Consciousness SpO2  
 12/08/18 2102              95 % 12/08/18 2015              94 % 12/08/18 2001              88 %  (Abnormal) 12/08/18 2000          107/56    90 % 12/08/18 1930          105/68    93 % 12/08/18 1900          101/57    89 %  (Abnormal) 12/08/18 1830          108/38  (Abnormal)     93 % 12/08/18 16:08:51  1  79  18  97.8 °F (36.6 °C)  123/79  Alert  96 %

## 2018-12-09 NOTE — PROGRESS NOTES
TRANSFER - IN REPORT: 
 
Verbal report received from Atiya Stack RN(name) on [address]  being received from ED(unit) Report consisted of patients Situation, Background, Assessment and  
Recommendations(SBAR). Information from the following report(s) SBAR, ED Summary, Intake/Output and Recent Results was reviewed with the receiving nurse. Opportunity for questions and clarification was provided. Assessment completed upon patients arrival to unit and care assumed. 0000: assessment completed. Pt resting in bed. Vs stable. Will continue to monitor. 0400: assessment completed. Pt resting in bed. Vs stable. Will continue to monitor. 0700: Bedside shift change report given to Neema Ramirez RN (oncoming nurse) by Erum Bangura RN 
 (offgoing nurse). Report included the following information SBAR, ED Summary, Intake/Output, MAR and Recent Results.

## 2018-12-09 NOTE — PROGRESS NOTES
Physical Exam  
Skin:  
 
  
0756 Assessment completed. Pt is awake; oriented x4. SR on the monitor. On Bipap machine. Lungs diminished on auscultation bu no wheezing. Abdomen soft, non tender. Last bm prior to admission. OOB to void. Skin is intact. 1015 Pt is wheezing. Dr. Froylan Vaughan wants HHN tx for pt. 
 
 
1200 Reassessment done. Voiced no complaints. Lungs sound with expiratory wheezing. Pt stated not feeling tight as it was. 1705 TRANSFER - OUT REPORT: 
 
Verbal report given to [address] RN(name) on [address]  being transferred to 3037(unit) for routine progression of care Report consisted of patients Situation, Background, Assessment and  
Recommendations(SBAR). Information from the following report(s) SBAR, Kardex, Intake/Output, MAR and Cardiac Rhythm SR was reviewed with the receiving nurse. Lines:  
Peripheral IV 12/08/18 Right Forearm (Active) Site Assessment Clean, dry, & intact 12/9/2018  4:00 PM  
Phlebitis Assessment 0 12/9/2018  4:00 PM  
Infiltration Assessment 0 12/9/2018  4:00 PM  
Dressing Status Clean, dry, & intact 12/9/2018  4:00 PM  
Dressing Type Transparent 12/9/2018  7:55 AM  
Hub Color/Line Status Blue;Capped 12/9/2018  7:55 AM  
Action Taken Open ports on tubing capped 12/9/2018  7:55 AM  
Alcohol Cap Used Yes 12/9/2018  7:55 AM  
  
 
Opportunity for questions and clarification was provided. Patient transported with: 
 O2 @ 5 liters

## 2018-12-10 PROBLEM — J96.01 ACUTE HYPOXEMIC RESPIRATORY FAILURE (HCC): Status: ACTIVE | Noted: 2018-12-10

## 2018-12-10 PROCEDURE — 74011636637 HC RX REV CODE- 636/637: Performed by: FAMILY MEDICINE

## 2018-12-10 PROCEDURE — 74011000250 HC RX REV CODE- 250: Performed by: HOSPITALIST

## 2018-12-10 PROCEDURE — 94640 AIRWAY INHALATION TREATMENT: CPT

## 2018-12-10 PROCEDURE — 65270000029 HC RM PRIVATE

## 2018-12-10 PROCEDURE — 74011250637 HC RX REV CODE- 250/637: Performed by: HOSPITALIST

## 2018-12-10 PROCEDURE — 74011250637 HC RX REV CODE- 250/637: Performed by: FAMILY MEDICINE

## 2018-12-10 PROCEDURE — 77010033678 HC OXYGEN DAILY

## 2018-12-10 PROCEDURE — 74011000250 HC RX REV CODE- 250: Performed by: FAMILY MEDICINE

## 2018-12-10 PROCEDURE — 94760 N-INVAS EAR/PLS OXIMETRY 1: CPT

## 2018-12-10 RX ORDER — HYDROCODONE POLISTIREX AND CHLORPHENIRAMINE POLISTIREX 10; 8 MG/5ML; MG/5ML
5 SUSPENSION, EXTENDED RELEASE ORAL
Status: DISCONTINUED | OUTPATIENT
Start: 2018-12-10 | End: 2018-12-12 | Stop reason: HOSPADM

## 2018-12-10 RX ORDER — IPRATROPIUM BROMIDE AND ALBUTEROL SULFATE 2.5; .5 MG/3ML; MG/3ML
3 SOLUTION RESPIRATORY (INHALATION)
Status: DISCONTINUED | OUTPATIENT
Start: 2018-12-10 | End: 2018-12-12 | Stop reason: HOSPADM

## 2018-12-10 RX ADMIN — IPRATROPIUM BROMIDE AND ALBUTEROL SULFATE 3 ML: .5; 3 SOLUTION RESPIRATORY (INHALATION) at 09:18

## 2018-12-10 RX ADMIN — PREDNISONE 50 MG: 20 TABLET ORAL at 10:35

## 2018-12-10 RX ADMIN — HYDROCODONE POLISTIREX AND CHLORPHENIRAMINE POLISTIREX 5 ML: 10; 8 SUSPENSION, EXTENDED RELEASE ORAL at 14:28

## 2018-12-10 RX ADMIN — GUAIFENESIN 1200 MG: 600 TABLET, EXTENDED RELEASE ORAL at 10:35

## 2018-12-10 RX ADMIN — GUAIFENESIN 1200 MG: 600 TABLET, EXTENDED RELEASE ORAL at 17:37

## 2018-12-10 RX ADMIN — FAMOTIDINE 20 MG: 20 TABLET ORAL at 10:35

## 2018-12-10 RX ADMIN — IPRATROPIUM BROMIDE AND ALBUTEROL SULFATE 3 ML: .5; 3 SOLUTION RESPIRATORY (INHALATION) at 12:30

## 2018-12-10 RX ADMIN — IPRATROPIUM BROMIDE AND ALBUTEROL SULFATE 3 ML: .5; 3 SOLUTION RESPIRATORY (INHALATION) at 20:24

## 2018-12-10 RX ADMIN — IPRATROPIUM BROMIDE AND ALBUTEROL SULFATE 3 ML: .5; 3 SOLUTION RESPIRATORY (INHALATION) at 23:55

## 2018-12-10 RX ADMIN — IPRATROPIUM BROMIDE AND ALBUTEROL SULFATE 3 ML: .5; 3 SOLUTION RESPIRATORY (INHALATION) at 16:49

## 2018-12-10 NOTE — PROGRESS NOTES
Problem: Falls - Risk of 
Goal: *Absence of Falls Document Jovan Pozo Fall Risk and appropriate interventions in the flowsheet. Outcome: Progressing Towards Goal 
Fall Risk Interventions: 
  
 
  
 
Medication Interventions: Teach patient to arise slowly Elimination Interventions: Call light in reach

## 2018-12-10 NOTE — PROGRESS NOTES
0730  Assumed care of pt from [facility]. Sleeping peacefully, skin warm and dry, pink. 0900  Awake; assessed. Breath sounds coarse with wheezes. Pt indicates she has been chronically ill with respiratory troubles since she was a child and is disabled. She is worried about her children, has three and a 11 month old baby at home. Asks about qualifying for home O2 and Medicaid coverage for [place] residency recently relocated here from [place] due to her spouse's job. 
51-64-15-48, bathed, dangelo well, O2 provided for use while in bathroom.

## 2018-12-10 NOTE — ROUTINE PROCESS
1937: Assumed care. Awake. HOB elevated. No sob on oxygen at 5 LPM. Denies any pain or discomfort at this time. Call light within reach. 2000: Patient c/o congested cough. Paged Dr. Kacie Capps. Informed. Order received for GuaiFENesin 1200 mg PO BID. First dose tonight. 2105: Due med given. 2330:leeping. 0046: No change from previous assessment. 0230: Sleeping. 
 
0525: No change from previous assessment. 1723: Slept good thru night. Needs attended. 0740: Bedside and Verbal shift change report given to Talon GOMEZ (oncoming nurse) by me (offgoing nurse). Report included the following information SBAR, Kardex, Intake/Output, MAR and Recent Results.

## 2018-12-10 NOTE — PROGRESS NOTES
Problem: Falls - Risk of 
Goal: *Absence of Falls Document Lynnette Diaz Fall Risk and appropriate interventions in the flowsheet. Outcome: Progressing Towards Goal 
Fall Risk Interventions: 
  
 
  
 
Medication Interventions: Teach patient to arise slowly Elimination Interventions: Call light in reach

## 2018-12-10 NOTE — CDMP QUERY
The medical record reflects the following: 
 
Risk:39 yo female w/PMH asthma Clinical Indicators: Per ED note  Pt states she feels no better after multiple breathing treatments 
 -Per ED NN  2033  Placed pt on 4L NC due to sats being at 88%; O2 increased to 90% 2020  pt still struggling to get enough air, and work of breathing has increased, bi-pap going to be ordered Per RT note  2104  Called to place patient on Bipap. I recommended CPAP due to oxygenation issues. SP02 improved from93% to 95%. Patient appears comfortable and in no distress 
-Per Pulmonary consult 12/9  Patient has significantly improved on NIV and I will continue BiPAP for now. - She has certainly much improved at this time and intubation/mechanical ventilation is not indicated. Treatment:  CPAP,  Stepdown care, solumedrol, duo nebs Please clarify if this patient is being treated/managed for: 
 
=>Persistent asthma exacerbation with hypoxic respiratory failure 
=>Other Explanation of clinical findings =>Unable to Determine (no explanation of clinical findings) Please clarify and document your clinical opinion in the progress notes and discharge summary including the definitive and/or presumptive diagnosis, (suspected or probable), related to the above clinical findings. Please include clinical findings supporting your diagnosis. If you DECLINE this query or would like to communicate with [facility], please utilize the \"[employer] message box\" at the TOP of the Progress Note on the right. Thank you, 
Alexa Sawyer RN [place]   [phone]

## 2018-12-10 NOTE — PROGRESS NOTES
conducted an initial consultation and Spiritual Assessment for Stephanie Bowen, who is a 41 y.o.,female. Patients Primary Language is: [place]. According to the patients EMR Mandaen Affiliation is: [place]. The reason the Patient came to the hospital is:  
Patient Active Problem List  
 Diagnosis Date Noted  Asthma exacerbation 12/08/2018 The  provided the following Interventions: 
Initiated a relationship of care and support. Explored issues of kavon, spirituality and/or Methodist needs while hospitalized. Listened empathically. Provided chaplaincy education. Provided information about Spiritual Care Services. Offered prayer and assurance of continued prayers on patient's behalf. Chart reviewed. The following outcomes were achieved: 
Patient shared some information about their medical narrative and spiritual journey/beliefs. Patient processed feeling about current hospitalization. Patient expressed gratitude for the 's visit. Assessment: 
Patient did not indicate any spiritual or Methodist issues which require Spiritual Care Services interventions at this time. Patient does not have any Methodist/cultural needs that will affect patients preferences in health care. Plan: 
Chaplains will continue to follow and will provide pastoral care on an as needed or requested basis.  recommends bedside caregivers page  on duty if patient shows signs of acute spiritual or emotional distress. [facility] Staff  Spiritual Care  
[phone]

## 2018-12-11 PROCEDURE — 74011250637 HC RX REV CODE- 250/637: Performed by: FAMILY MEDICINE

## 2018-12-11 PROCEDURE — 94667 MNPJ CHEST WALL 1ST: CPT

## 2018-12-11 PROCEDURE — 94760 N-INVAS EAR/PLS OXIMETRY 1: CPT

## 2018-12-11 PROCEDURE — 94640 AIRWAY INHALATION TREATMENT: CPT

## 2018-12-11 PROCEDURE — 74011636637 HC RX REV CODE- 636/637: Performed by: FAMILY MEDICINE

## 2018-12-11 PROCEDURE — 74011000250 HC RX REV CODE- 250: Performed by: HOSPITALIST

## 2018-12-11 PROCEDURE — 65270000029 HC RM PRIVATE

## 2018-12-11 PROCEDURE — 74011250637 HC RX REV CODE- 250/637: Performed by: HOSPITALIST

## 2018-12-11 RX ADMIN — IPRATROPIUM BROMIDE AND ALBUTEROL SULFATE 3 ML: .5; 3 SOLUTION RESPIRATORY (INHALATION) at 20:20

## 2018-12-11 RX ADMIN — GUAIFENESIN 1200 MG: 600 TABLET, EXTENDED RELEASE ORAL at 17:31

## 2018-12-11 RX ADMIN — FAMOTIDINE 20 MG: 20 TABLET ORAL at 09:11

## 2018-12-11 RX ADMIN — HYDROCODONE POLISTIREX AND CHLORPHENIRAMINE POLISTIREX 5 ML: 10; 8 SUSPENSION, EXTENDED RELEASE ORAL at 14:25

## 2018-12-11 RX ADMIN — IPRATROPIUM BROMIDE AND ALBUTEROL SULFATE 3 ML: .5; 3 SOLUTION RESPIRATORY (INHALATION) at 08:32

## 2018-12-11 RX ADMIN — PREDNISONE 50 MG: 20 TABLET ORAL at 09:11

## 2018-12-11 RX ADMIN — IPRATROPIUM BROMIDE AND ALBUTEROL SULFATE 3 ML: .5; 3 SOLUTION RESPIRATORY (INHALATION) at 16:21

## 2018-12-11 RX ADMIN — IPRATROPIUM BROMIDE AND ALBUTEROL SULFATE 3 ML: .5; 3 SOLUTION RESPIRATORY (INHALATION) at 11:39

## 2018-12-11 RX ADMIN — HYDROCODONE POLISTIREX AND CHLORPHENIRAMINE POLISTIREX 5 ML: 10; 8 SUSPENSION, EXTENDED RELEASE ORAL at 01:30

## 2018-12-11 RX ADMIN — GUAIFENESIN 1200 MG: 600 TABLET, EXTENDED RELEASE ORAL at 09:11

## 2018-12-11 NOTE — ROUTINE PROCESS
8736 Received report from [facility]. Patient alert and oriented. 1030 Patient currently resting in bed, alert and oriented x 4, denies pain or shortness of breath, call bell in reach, 5 Ps and hourly rounding completed, bed locked in low position. 130 Patient watching TV, denies any pain. 1425 Patient c/o NPC - cough meds given. 1930 Bedside and Verbal shift change report given to Pamela RN (oncoming nurse) by Danielle Salguero RN (offgoing nurse). Report included the following information SBAR, Kardex, Intake/Output, MAR and Recent Results.

## 2018-12-11 NOTE — PROGRESS NOTES
Problem: Falls - Risk of 
Goal: *Absence of Falls Document [place] Fall Risk and appropriate interventions in the flowsheet. Outcome: Progressing Towards Goal 
Fall Risk Interventions: 
  
 
  
 
Medication Interventions: Patient to call before getting OOB, Teach patient to arise slowly Elimination Interventions: Call light in reach, Patient to call for help with toileting needs

## 2018-12-11 NOTE — PROGRESS NOTES
Internal Medicine Progress Note    Patient's Name: Juma Antoine  Admit Date: 12/8/2018  Length of Stay: 3      Assessment/Plan     Active Hospital Problems    Diagnosis Date Noted    Acute hypoxemic respiratory failure ([address] 12/10/2018    Asthma exacerbation 12/08/2018   Persistent asthma exacerbation with hypoxic respiratory failure    - Cont duonebs  - Cont steroids  - Mucinex/cough suppressant PRN  - Add chest PT/acapella  - Titrate off O2 as able  - Cont acceptable home medications for chronic conditions   - DVT protocol    I have personally reviewed all pertinent labs and films that have officially resulted over the last 24 hours. I have personally checked for all pending labs that are awaiting final results. Subjective     Pt s/e @ bedside  No major events overnight  Not much better than yest  Still w/ diffuse wheeze/rhonchi  Denies CP    Objective     Visit Vitals  /50   Pulse 76   Temp 97.4 °F (36.3 °C)   Resp 20   Ht 5' 9\" (1.753 m)   Wt 99.8 kg (220 lb 0.3 oz)   SpO2 93%   BMI 32.49 kg/m²       Physical Exam:  General Appearance: NAD, conversant  Lungs: Decreased BS B/L w/ diffuse exp wheeze/rhonchi  CV: RRR, no m/r/g  Abdomen: soft, non-tender, normal bowel sounds  Extremities: no cyanosis, no peripheral edema  Neuro: No focal deficits, motor/sensory intact    Lab/Data Reviewed:  BMP: No results found for: NA, K, CL, CO2, AGAP, GLU, BUN, CREA, GFRAA, GFRNA  CBC: No results found for: WBC, HGB, HGBEXT, HCT, HCTEXT, PLT, PLTEXT, HGBEXT, HCTEXT, PLTEXT    Imaging Reviewed:  No results found.     Medications Reviewed:  Current Facility-Administered Medications   Medication Dose Route Frequency    chlorpheniramine-HYDROcodone (TUSSIONEX) oral suspension 5 mL  5 mL Oral Q12H PRN    albuterol-ipratropium (DUO-NEB) 2.5 MG-0.5 MG/3 ML  3 mL Nebulization Q4H RT    guaiFENesin ER (MUCINEX) tablet 1,200 mg  1,200 mg Oral BID    acetaminophen (TYLENOL) tablet 650 mg  650 mg Oral Q4H PRN    ondansetron (ZOFRAN) injection 4 mg  4 mg IntraVENous Q4H PRN    predniSONE (DELTASONE) tablet 50 mg  50 mg Oral DAILY WITH BREAKFAST    nicotine (NICODERM CQ) 14 mg/24 hr patch 1 Patch  1 Patch TransDERmal DAILY    famotidine (PEPCID) tablet 20 mg  20 mg Oral DAILY           Tez Tafoya DO  Internal Medicine, Hospitalist  Pager: [phone]  [address] [place] [address]

## 2018-12-11 NOTE — ROUTINE PROCESS
1920: Assumed care. Awake. Resting in bed. HOB elevated. On oxygen at 5 LPM. Denies any pain or discomfort at his time. Call light within reach. 2100: HS snack provided per patient request.  
 
5086: Resting in bed. 
 
0121: No change from previous assessment. 0130: PRN cough medicine given per patient request. 
 
5078: No change from previous assessment. 9894: Slept good thru night. Needs attended. 0730: Bedside and Verbal shift change report given to Estelle Freeman (oncoming nurse) by me (offgoing nurse). Report included the following information SBAR, Kardex, Intake/Output, MAR and Recent Results.

## 2018-12-12 VITALS
WEIGHT: 220.02 LBS | BODY MASS INDEX: 32.59 KG/M2 | DIASTOLIC BLOOD PRESSURE: 71 MMHG | OXYGEN SATURATION: 90 % | HEART RATE: 107 BPM | TEMPERATURE: 98.2 F | RESPIRATION RATE: 18 BRPM | SYSTOLIC BLOOD PRESSURE: 104 MMHG | HEIGHT: 69 IN

## 2018-12-12 PROCEDURE — 74011250637 HC RX REV CODE- 250/637: Performed by: HOSPITALIST

## 2018-12-12 PROCEDURE — 90686 IIV4 VACC NO PRSV 0.5 ML IM: CPT | Performed by: HOSPITALIST

## 2018-12-12 PROCEDURE — 90471 IMMUNIZATION ADMIN: CPT

## 2018-12-12 PROCEDURE — 74011636637 HC RX REV CODE- 636/637: Performed by: FAMILY MEDICINE

## 2018-12-12 PROCEDURE — 74011250636 HC RX REV CODE- 250/636: Performed by: HOSPITALIST

## 2018-12-12 PROCEDURE — 74011000250 HC RX REV CODE- 250: Performed by: HOSPITALIST

## 2018-12-12 PROCEDURE — 74011250637 HC RX REV CODE- 250/637: Performed by: FAMILY MEDICINE

## 2018-12-12 PROCEDURE — 94640 AIRWAY INHALATION TREATMENT: CPT

## 2018-12-12 RX ORDER — PREDNISONE 50 MG/1
50 TABLET ORAL
Qty: 2 TAB | Refills: 0 | Status: SHIPPED | OUTPATIENT
Start: 2018-12-13 | End: 2018-12-15

## 2018-12-12 RX ORDER — ALBUTEROL SULFATE 90 UG/1
1 AEROSOL, METERED RESPIRATORY (INHALATION)
Qty: 1 INHALER | Refills: 0 | Status: SHIPPED | OUTPATIENT
Start: 2018-12-12 | End: 2021-03-11

## 2018-12-12 RX ADMIN — INFLUENZA VIRUS VACCINE 0.5 ML: 15; 15; 15; 15 SUSPENSION INTRAMUSCULAR at 15:24

## 2018-12-12 RX ADMIN — IPRATROPIUM BROMIDE AND ALBUTEROL SULFATE 3 ML: .5; 3 SOLUTION RESPIRATORY (INHALATION) at 11:25

## 2018-12-12 RX ADMIN — IPRATROPIUM BROMIDE AND ALBUTEROL SULFATE 3 ML: .5; 3 SOLUTION RESPIRATORY (INHALATION) at 15:42

## 2018-12-12 RX ADMIN — IPRATROPIUM BROMIDE AND ALBUTEROL SULFATE 3 ML: .5; 3 SOLUTION RESPIRATORY (INHALATION) at 07:55

## 2018-12-12 RX ADMIN — HYDROCODONE POLISTIREX AND CHLORPHENIRAMINE POLISTIREX 5 ML: 10; 8 SUSPENSION, EXTENDED RELEASE ORAL at 02:34

## 2018-12-12 RX ADMIN — FAMOTIDINE 20 MG: 20 TABLET ORAL at 09:11

## 2018-12-12 RX ADMIN — GUAIFENESIN 1200 MG: 600 TABLET, EXTENDED RELEASE ORAL at 09:11

## 2018-12-12 RX ADMIN — IPRATROPIUM BROMIDE AND ALBUTEROL SULFATE 3 ML: .5; 3 SOLUTION RESPIRATORY (INHALATION) at 04:41

## 2018-12-12 RX ADMIN — HYDROCODONE POLISTIREX AND CHLORPHENIRAMINE POLISTIREX 5 ML: 10; 8 SUSPENSION, EXTENDED RELEASE ORAL at 14:27

## 2018-12-12 RX ADMIN — PREDNISONE 50 MG: 20 TABLET ORAL at 09:11

## 2018-12-12 RX ADMIN — IPRATROPIUM BROMIDE AND ALBUTEROL SULFATE 3 ML: .5; 3 SOLUTION RESPIRATORY (INHALATION) at 00:29

## 2018-12-12 NOTE — PROGRESS NOTES
Problem: Falls - Risk of  Goal: *Absence of Falls  Document [place] Fall Risk and appropriate interventions in the flowsheet.   Outcome: Progressing Towards Goal  Fall Risk Interventions:            Medication Interventions: Patient to call before getting OOB, Teach patient to arise slowly    Elimination Interventions: Call light in reach

## 2018-12-12 NOTE — DISCHARGE INSTRUCTIONS
FOLLOW UP VISIT Appointment in: One Week Please follow up with your PCP within 7 days to discuss your recent hospitalization. Patient to arrange. DISCHARGE SUMMARY from Nurse    PATIENT INSTRUCTIONS:    After general anesthesia or intravenous sedation, for 24 hours or while taking prescription Narcotics:  · Limit your activities  · Do not drive and operate hazardous machinery  · Do not make important personal or business decisions  · Do  not drink alcoholic beverages  · If you have not urinated within 8 hours after discharge, please contact your surgeon on call. Report the following to your surgeon:  · Excessive pain, swelling, redness or odor of or around the surgical area  · Temperature over 100.5  · Nausea and vomiting lasting longer than 4 hours or if unable to take medications  · Any signs of decreased circulation or nerve impairment to extremity: change in color, persistent  numbness, tingling, coldness or increase pain  · Any questions    What to do at Home:  Recommended activity: Activity as tolerated,     If you experience any of the following symptoms shortness of breath, chest pain, or any other concerns, please follow up with primary care physician. *  Please give a list of your current medications to your Primary Care Provider. *  Please update this list whenever your medications are discontinued, doses are      changed, or new medications (including over-the-counter products) are added. *  Please carry medication information at all times in case of emergency situations. These are general instructions for a healthy lifestyle:    No smoking/ No tobacco products/ Avoid exposure to second hand smoke  Surgeon General's Warning:  Quitting smoking now greatly reduces serious risk to your health.     Obesity, smoking, and sedentary lifestyle greatly increases your risk for illness    A healthy diet, regular physical exercise & weight monitoring are important for maintaining a healthy lifestyle    You may be retaining fluid if you have a history of heart failure or if you experience any of the following symptoms:  Weight gain of 3 pounds or more overnight or 5 pounds in a week, increased swelling in our hands or feet or shortness of breath while lying flat in bed. Please call your doctor as soon as you notice any of these symptoms; do not wait until your next office visit. Recognize signs and symptoms of STROKE:    F-face looks uneven    A-arms unable to move or move unevenly    S-speech slurred or non-existent    T-time-call 911 as soon as signs and symptoms begin-DO NOT go       Back to bed or wait to see if you get better-TIME IS BRAIN. Warning Signs of HEART ATTACK     Call 911 if you have these symptoms:   Chest discomfort. Most heart attacks involve discomfort in the center of the chest that lasts more than a few minutes, or that goes away and comes back. It can feel like uncomfortable pressure, squeezing, fullness, or pain.  Discomfort in other areas of the upper body. Symptoms can include pain or discomfort in one or both arms, the back, neck, jaw, or stomach.  Shortness of breath with or without chest discomfort.  Other signs may include breaking out in a cold sweat, nausea, or lightheadedness. Don't wait more than five minutes to call 911 - MINUTES MATTER! Fast action can save your life. Calling 911 is almost always the fastest way to get lifesaving treatment. Emergency Medical Services staff can begin treatment when they arrive -- up to an hour sooner than if someone gets to the hospital by car. The discharge information has been reviewed with the patient. The patient verbalized understanding. Discharge medications reviewed with the patient and appropriate educational materials and side effects teaching were provided.   ___________________________________________________________________________________________________________________________________    Patient armband removed and shredded. MyChart Activation    Thank you for requesting access to [facility]. Please follow the instructions below to securely access and download your online medical record. [facility] allows you to send messages to your doctor, view your test results, renew your prescriptions, schedule appointments, and more. How Do I Sign Up? 1. In your internet browser, go to [email or web address]  2. Click on the First Time User? Click Here link in the Sign In box. You will be redirect to the New Member Sign Up page. 3. Enter your [facility]ook[facility] Access Code exactly as it appears below. You will not need to use this code after youve completed the sign-up process. If you do not sign up before the expiration date, you must request a new code. Sook[facility] Access Code: Sophie Luis  Expires: 2019  8:40 AM (This is the date your Sookbox access code will )    4. Enter the last four digits of your Social Security Number (xxxx) and Date of Birth (mm/dd/yyyy) as indicated and click Submit. You will be taken to the next sign-up page. 5. Create a Sookbox ID. This will be your Sookbox login ID and cannot be changed, so think of one that is secure and easy to remember. 6. Create a Sook[facility] password. You can change your password at any time. 7. Enter your Password Reset Question and Answer. This can be used at a later time if you forget your password. 8. Enter your e-mail address. You will receive e-mail notification when new information is available in [address]. 9. Click Sign Up. You can now view and download portions of your medical record. 10. Click the Download Summary menu link to download a portable copy of your medical information. Additional Information    If you have questions, please visit the Frequently Asked Questions section of the [employer]ook[employer] website at [email or web address] Remember, [employer] is NOT to be used for urgent needs.  For medical emergencies, dial 911.          Learning About Asthma Triggers  What are triggers? When you have asthma, certain things can make your symptoms worse. These are called triggers. They include:  · Cigarette smoke or air pollution. · Things you are allergic to, such as:  ¨ Pollen, mold, or dust mites. ¨ Pet hair, skin, or saliva. · Illnesses, like colds, flu, or pneumonia. · Exercise. · Dry, cold air. How do triggers affect asthma? Triggers can make it harder for your lungs to work as they should and can lead to sudden difficulty breathing and other symptoms. When you are around a trigger, an asthma attack is more likely. If your symptoms are severe, you may need emergency treatment or have to go to the hospital for treatment. If you know what your triggers are and can avoid them, you may be able to prevent asthma attacks, reduce how often you have them, and make them less severe. What can you do to avoid triggers? The first thing is to know your triggers. When you are having symptoms, note the things around you that might be causing them. Then look for patterns in what may be triggering your symptoms. Record your triggers on a piece of paper or in an asthma diary. When you have your list of possible triggers, work with your doctor to find ways to avoid them. You also can check how well your lungs are working by measuring your peak expiratory flow (PEF) throughout the day. Your PEF may drop when you are near things that trigger symptoms. Here are some ways to avoid a few common triggers. · Do not smoke or allow others to smoke around you. If you need help quitting, talk to your doctor about stop-smoking programs and medicines. These can increase your chances of quitting for good. · If there is a lot of pollution, pollen, or dust outside, stay at home and keep your windows closed. Use an air conditioner or air filter in your home. Check your local weather report or newspaper for air quality and pollen reports.   · Get a flu shot every year. Talk to your doctor about getting a pneumococcal shot. Wash your hands often to prevent infections. · Avoid exercising outdoors in cold weather. If you are outdoors in cold weather, wear a scarf around your face and breathe through your nose. How can you manage an asthma attack? · If you have an asthma action plan, follow the plan. In general:  ¨ Use your quick-relief inhaler as directed by your doctor. If your symptoms do not get better after you use your medicine, have someone take you to the emergency room. Call an ambulance if needed. ¨ If your doctor has given you other inhaled medicines or steroid pills, take them as directed. Where can you learn more? Go to [employer].be  Enter M564 in the search box to learn more about \"Learning About Asthma Triggers. \"   © 1117-1583 Healthwise, Incorporated. Care instructions adapted under license by [facility] [employer] (which disclaims liability or warranty for this information). This care instruction is for use with your licensed healthcare professional. If you have questions about a medical condition or this instruction, always ask your healthcare professional. [place] [address] any warranty or liability for your use of this information. Content Version: 19.0.059731; Last Revised: February 23, 2012                Asthma Action Plan: After Your Visit  Your Care Instructions  An asthma action plan is based on peak flow and asthma symptoms. Sorting symptoms and peak flow into red, yellow, and green \"zones\" can help you know how bad your asthma is and what actions you should take. Work with your doctor to make your plan. An action plan may include:  · The peak flow readings and symptoms for each zone. · What medicines to take in each zone. · When to call a doctor. · A list of emergency contact numbers. · A list of your asthma triggers. Follow-up care is a key part of your treatment and safety.  Be sure to make and go to all appointments, and call your doctor if you are having problems. It's also a good idea to know your test results and keep a list of the medicines you take. How can you care for yourself at home? · Take your daily medicines to help minimize long-term damage and avoid asthma attacks. · Check your peak flow every morning and evening. This is the best way to know how well your lungs are working. · Check your action plan to see what zone you are in.  ¨ If you are in the green zone, keep taking your daily asthma medicines as prescribed. ¨ If you are in the yellow zone, you may be having or will soon have an asthma attack. You may not have any symptoms, but your lungs are not working as well as they should. Take the medicines listed in your action plan. If you stay in the yellow zone, your doctor may need to increase the dose or add a medicine. ¨ If you are in the red zone, follow your action plan. If your symptoms or peak flow don't improve soon, you may need to go to the emergency room or be admitted to the hospital.  · Use an asthma diary. Write down your peak flow readings in the asthma diary. If you have an attack, write down what caused it (if you know), the symptoms, and what medicine you took. · Make sure you know how and when to call your doctor or go to the hospital.  · Take both the asthma action plan and the asthma diary--along with your peak flow meter and medicines--when you see your doctor. Tell your doctor if you are having trouble following your action plan. When should you call for help? Call 911 anytime you think you may need emergency care. For example, call if:  · You have severe trouble breathing. Call your doctor now or seek immediate medical care if:  · Your symptoms do not get better after you have followed your asthma action plan. · You cough up yellow, dark brown, or bloody mucus (sputum).   Watch closely for changes in your health, and be sure to contact your doctor if:  · Your coughing and wheezing get worse. · You need to use quick-relief medicine on more than 2 days a week (unless it is just for exercise). · You need help figuring out what is triggering your asthma attacks. Where can you learn more? Go to [employer].be  Enter B511 in the search box to learn more about \"Asthma Action Plan: After Your Visit. \"   © 4453-5307 Healthwise, Incorporated. Care instructions adapted under license by [employer] (which disclaims liability or warranty for this information). This care instruction is for use with your licensed healthcare professional. If you have questions about a medical condition or this instruction, always ask your healthcare professional. [place] [address] any warranty or liability for your use of this information. Content Version: 39.1.312339; Last Revised: March 9, 2012                   Asthma Attack: Care Instructions  Your Care Instructions    During an asthma attack, the airways swell and narrow. This makes it hard to breathe. Severe asthma attacks can be life-threatening, but you can help prevent them by keeping your asthma under control and treating symptoms before they get bad. Symptoms include being short of breath, having chest tightness, coughing, and wheezing. Noting and treating these symptoms can also help you avoid future trips to the emergency room. The doctor has checked you carefully, but problems can develop later. If you notice any problems or new symptoms, get medical treatment right away. Follow-up care is a key part of your treatment and safety. Be sure to make and go to all appointments, and call your doctor if you are having problems. It's also a good idea to know your test results and keep a list of the medicines you take. How can you care for yourself at home? · Follow your asthma action plan to prevent and treat attacks.  If you don't have an asthma action plan, work with your doctor to create one.  · Take your asthma medicines exactly as prescribed. Talk to your doctor right away if you have any questions about how to take them. ? Use your quick-relief medicine when you have symptoms of an attack. Quick-relief medicine is usually an albuterol inhaler. Some people need to use quick-relief medicine before they exercise. ? Take your controller medicine every day, not just when you have symptoms. Controller medicine is usually an inhaled corticosteroid. The goal is to prevent problems before they occur. Don't use your controller medicine to treat an attack that has already started. It doesn't work fast enough to help. ? If your doctor prescribed corticosteroid pills to use during an attack, take them exactly as prescribed. It may take hours for the pills to work, but they may make the episode shorter and help you breathe better. ? Keep your quick-relief medicine with you at all times. · Talk to your doctor before using other medicines. Some medicines, such as aspirin, can cause asthma attacks in some people. · If you have a peak flow meter, use it to check how well you are breathing. This can help you predict when an asthma attack is going to occur. Then you can take medicine to prevent the asthma attack or make it less severe. · Do not smoke or allow others to smoke around you. Avoid smoky places. Smoking makes asthma worse. If you need help quitting, talk to your doctor about stop-smoking programs and medicines. These can increase your chances of quitting for good. · Learn what triggers an asthma attack for you, and avoid the triggers when you can. Common triggers include colds, smoke, air pollution, dust, pollen, mold, pets, cockroaches, stress, and cold air. · Avoid colds and the flu. Get a pneumococcal vaccine shot. If you have had one before, ask your doctor if you need a second dose. Get a flu vaccine every fall. If you must be around people with colds or the flu, wash your hands often.   When should you call for help? Call 911 anytime you think you may need emergency care. For example, call if:    · You have severe trouble breathing.    Call your doctor now or seek immediate medical care if:    · Your symptoms do not get better after you have followed your asthma action plan.     · You have new or worse trouble breathing.     · Your coughing and wheezing get worse.     · You cough up dark brown or bloody mucus (sputum).     · You have a new or higher fever.    Watch closely for changes in your health, and be sure to contact your doctor if:    · You need to use quick-relief medicine on more than 2 days a week (unless it is just for exercise).     · You cough more deeply or more often, especially if you notice more mucus or a change in the color of your mucus.     · You are not getting better as expected. Where can you learn more? Go to http://fanyFashion.meileana.info/. Enter T291 in the search box to learn more about \"Asthma Attack: Care Instructions. \"  Current as of: December 6, 2017  Content Version: 11.8  © 7519-9833 [employer]. Care instructions adapted under license by [employer] (which disclaims liability or warranty for this information). If you have questions about a medical condition or this instruction, always ask your healthcare professional. [address] any warranty or liability for your use of this information. Using a Metered-Dose Inhaler: Care Instructions  Your Care Instructions    A metered-dose inhaler lets you breathe medicine into your lungs quickly. Inhaled medicine works faster than the same medicine in a pill. An inhaler allows you to take less medicine than you would need if you took it as a pill. \"Metered-dose\" means that the inhaler gives a measured amount of medicine each time you use it.  A metered-dose inhaler gives medicine in the form of a liquid mist.  Your doctor may want you to use a spacer with your inhaler. A spacer is a chamber that you attach to the inhaler. The chamber holds the medicine before you inhale it. That way, you can inhale the medicine in as many breaths as you need. Doctors recommend using a spacer with most metered-dose inhalers, especially those with corticosteroid medicines. Follow-up care is a key part of your treatment and safety. Be sure to make and go to all appointments, and call your doctor if you are having problems. It's also a good idea to know your test results and keep a list of the medicines you take. How can you care for yourself at home? To get started using your inhaler  · Talk with your health care provider to be sure you are using your inhaler the right way. It might help if you practice using it in front of a mirror. Use the inhaler exactly as prescribed. · Check that you have the correct medicine. If you use more than one inhaler, put a label on each one. This will let you know which one to use at the right time. · Keep track of how much medicine is in the inhaler. Check the label to see how many doses are in the container. If you know how many puffs you can take, you can replace the inhaler before you run out. Ask your health care provider how you can keep track of how much medicine is left. · Use a spacer if you have problems pressing the inhaler and breathing in at the same time. You also may need a spacer if you are using corticosteroid medicines. · If you are using a corticosteroid inhaler, gargle and rinse out your mouth with water after use. Do not swallow the water. Swallowing the water will increase the chance that the medicine will get into your bloodstream. This may make it more likely that you will have side effects. To use a spacer with an inhaler  1. Shake the inhaler. Remove the inhaler cap, and place the mouthpiece of the inhaler into the spacer. Check the inhaler instructions to see if you need to prime your inhaler before you use it.  If it needs priming, follow the instructions on how to prime your inhaler. 2. Remove the cap from the spacer. 3. Hold the inhaler upright with the mouthpiece at the bottom. 4. Tilt your head back a little, and breathe out slowly and completely. 5. Place the spacer's mouthpiece in your mouth. 6. Press down on the inhaler to spray one puff of medicine into the spacer, and then start breathing in slowly. Wait to inhale until after you have pressed down on the inhaler. Some spacers have a whistle. If you hear it, you should breathe in more slowly. 7. Hold your breath for 10 seconds. This will let the medicine settle in your lungs. 8. If you need to take a second dose, wait 30 to 60 seconds to allow the inhaler valve to refill. To use an inhaler without a spacer  1. Shake the inhaler as directed. Remove the cap. Check the instructions to see if you need to prime your inhaler before you use it. If it needs priming, follow the instructions on how to prime your inhaler. 2. Hold the inhaler upright with the mouthpiece at the bottom. 3. Tilt your head back a little, and breathe out slowly and completely. 4. Position the inhaler in one of two ways:  ? You can place the inhaler in your mouth. This is easier for most people. And it lowers the risk that any of the medicine will get into your eyes. ? Or you can place the inhaler 1 to 2 inches in front of your open mouth, without closing your lips over it. Try to open your mouth as wide as you can. Placing the inhaler in front of your open mouth may be better for getting the medicine into your lungs. But some people may find this too hard to do. 5. Start taking slow, even breaths through your mouth. Press down on the inhaler once, then inhale fully. 6. Hold your breath for 10 seconds. This will let the medicine settle in your lungs. 7. If you need to take a second dose, wait 30 to 60 seconds to allow the inhaler valve to refill. Where can you learn more?   Go to http://fany-ileana.info/. Enter K111 in the search box to learn more about \"Using a Metered-Dose Inhaler: Care Instructions. \"  Current as of: November 12, 2017  Content Version: 11.8  © 3669-8037 [employer]. Care instructions adapted under license by [employer] (which disclaims liability or warranty for this information). If you have questions about a medical condition or this instruction, always ask your healthcare professional. [address] any warranty or liability for your use of this information. Using a Dry Powder Inhaler: Care Instructions  Your Care Instructions    A dry powder inhaler lets you breathe medicine into your lungs quickly. Inhaled medicine works faster than the same medicine in a pill. An inhaler lets you take less medicine than you would need if you took it as a pill. A dry powder inhaler delivers medicine in the form of a fine powder. Dry powder inhalers are breath-activated. This means when you breathe in through the inhaler, the inhaler releases medicine into your lungs. Dry powder inhalers come in different shapes and sizes. For some, you need to add the medicine to the inhaler each time you use it. Other dry powder inhalers come with a supply of medicine already in them. But for these, you will need to [facility]" each dose of medicine each time you use it. How you load a dose depends on the type of inhaler you have. Follow-up care is a key part of your treatment and safety. Be sure to make and go to all appointments, and call your doctor if you are having problems. It's also a good idea to know your test results and keep a list of the medicines you take. How can you care for yourself at home? To get started  · Talk with your doctor, respiratory therapist, or pharmacist to be sure you are using your inhaler the right way. It might help if you practice using it in front of a mirror.  Use the inhaler exactly as prescribed. · Check that you have the correct medicine. If you use several inhalers, put a label on each one so that you know which one to use at the right time. · Keep your inhaler in a cool, dry place. Do not store your inhaler in the bathroom. Moisture in the air can cause the dry powder to clump together. This can clog the inhaler. · Keep track of how much medicine is in the inhaler. Some dry powder inhalers have dose counters that show how many doses are left. If your inhaler does not have a dose counter, your doctor or pharmacist can teach you how to keep track of how much medicine is left. · If you are using a steroid medicine in the inhaler, gargle and rinse out your mouth with water after use. Do not swallow the water. Swallowing the water will increase the chance that the medicine will get into your bloodstream. This may make it more likely that you will have side effects. · Some powder may build up on the inhaler. You do not need to clean the inhaler every day. Follow your doctor's or pharmacist's instructions for cleaning your inhaler. To use a dry powder inhaler  · Remove the inhaler cap, if there is one. · Add or load a dose of medicine as directed by your health care provider. · Tilt your head back a little, and breathe out slowly and completely. Hold the inhaler away from your mouth while you breathe out. Do not breathe out into the inhaler. This can blow some of the powder medicine out of the inhaler. The moisture in your breath also can cause the dry powder to clump together and clog the inhaler. · Place the inhaler's mouthpiece in your mouth. Close your lips tightly around the mouthpiece. · Inhale quickly and deeply through your mouth for 2 or 3 seconds. This pulls the powder from the inhaler into your lungs. After you have inhaled the powder, take the inhaler out of your mouth. · Hold your breath for 10 seconds. This will let the medicine settle in your lungs.  Then slowly breathe out through pursed lips. Make sure not to breathe out into the inhaler. · Repeat these steps if you need to take a second dose. Where can you learn more? Go to http://fany-ileana.info/. Enter 0489 [id number] in the search box to learn more about \"Using a Dry Powder Inhaler: Care Instructions. \"  Current as of: November 12, 2017  Content Version: 11.8  © 0784-7646 [employer]. Care instructions adapted under license by [employer] (which disclaims liability or warranty for this information). If you have questions about a medical condition or this instruction, always ask your healthcare professional. [place] [address] any warranty or liability for your use of this information. Asthma in Adults: Care Instructions  Your Care Instructions    During an asthma attack, your airways swell and narrow as a reaction to certain things (triggers). This makes it hard to breathe. You may be able to prevent asthma attacks if you avoid the things that set off your asthma symptoms. Keeping your asthma under control and treating symptoms before they get bad can help you avoid severe attacks. If you can control your asthma, you may be able to do all of your normal daily activities. You may also avoid asthma attacks and trips to the hospital.  Follow-up care is a key part of your treatment and safety. Be sure to make and go to all appointments, and call your doctor if you are having problems. It's also a good idea to know your test results and keep a list of the medicines you take. How can you care for yourself at home? · Follow your asthma action plan so you can manage your symptoms at home. An asthma action plan will help you prevent and control airway reactions and will tell you what to do during an asthma attack. If you do not have an asthma action plan, work with your doctor to build one. · Take your asthma medicine exactly as prescribed.  Medicine plays an important role in controlling asthma. Talk to your doctor right away if you have any questions about what to take and how to take it. ? Use your quick-relief medicine when you have symptoms of an attack. Quick-relief medicine often is an albuterol inhaler. Some people need to use quick-relief medicine before they exercise. ? Take your controller medicine every day, not just when you have symptoms. Controller medicine is usually an inhaled corticosteroid. The goal is to prevent problems before they occur. Do not use your controller medicine to try to treat an attack that has already started. It does not work fast enough to help. ? If your doctor prescribed corticosteroid pills to use during an attack, take them as directed. They may take hours to work, but they may shorten the attack and help you breathe better. ? Keep your quick-relief medicine with you at all times. · Talk to your doctor before using other medicines. Some medicines, such as aspirin, can cause asthma attacks in some people. · Check yourself for asthma symptoms to know which step to follow in your action plan. Watch for things like being short of breath, having chest tightness, coughing, and wheezing. Also notice if symptoms wake you up at night or if you get tired quickly when you exercise. · If you have a peak flow meter, use it to check how well you are breathing. This can help you predict when an asthma attack is going to occur. Then you can take medicine to prevent the asthma attack or make it less severe. · See your doctor regularly. These visits will help you learn more about asthma and what you can do to control it. Your doctor will monitor your treatment to make sure the medicine is helping you. · Keep track of your asthma attacks and your treatment. After you have had an attack, write down what triggered it, what helped end it, and any concerns you have about your asthma action plan. Take your diary when you see your doctor.  You can then review your asthma action plan and decide if it is working. · Do not smoke or allow others to smoke around you. Avoid smoky places. Smoking makes asthma worse. If you need help quitting, talk to your doctor about stop-smoking programs and medicines. These can increase your chances of quitting for good. · Learn what triggers an asthma attack for you, and avoid the triggers when you can. Common triggers include colds, smoke, air pollution, dust, pollen, mold, pets, cockroaches, stress, and cold air. · Avoid colds and the flu. Get a pneumococcal vaccine shot. If you have had one before, ask your doctor whether you need a second dose. Get a flu vaccine every fall. If you must be around people with colds or the flu, wash your hands often. When should you call for help? Call 911 anytime you think you may need emergency care. For example, call if:    · You have severe trouble breathing.    Call your doctor now or seek immediate medical care if:    · Your symptoms do not get better after you have followed your asthma action plan.     · You cough up yellow, dark brown, or bloody mucus (sputum).    Watch closely for changes in your health, and be sure to contact your doctor if:    · Your coughing and wheezing get worse.     · You need to use quick-relief medicine on more than 2 days a week (unless it is just for exercise).     · You need help figuring out what is triggering your asthma attacks. Where can you learn more? Go to http://fany-ileana.info/. Enter P597 in the search box to learn more about \"Asthma in Adults: Care Instructions. \"  Current as of: December 6, 2017  Content Version: 11.8  © 3102-7343 [employer]. Care instructions adapted under license by "[employer] (which disclaims liability or warranty for this information).  If you have questions about a medical condition or this instruction, always ask your healthcare professional. Myah Colin disclaims any warranty or liability for your use of this information. Controlling Your Asthma: Care Instructions  Your Care Instructions    Asthma is a long-term condition that affects your breathing. It causes the airways that lead to the lungs to swell. During an asthma attack, the airways swell and narrow. This makes it hard to breathe. You may wheeze or cough. If you have a bad attack, you may need emergency care. There are two things to do to treat asthma. · Control asthma over the long term. · Treat attacks when they occur. You and your doctor can make an asthma action plan. It tells you what medicines you need to take every day to control asthma symptoms and what to do if you have an asthma attack. Your asthma action plan can help prevent and treat attacks. When you keep your asthma under control, you can prevent severe attacks and lasting damage to your airways. You need to treat your asthma even when you are not having symptoms. Although asthma is a lifelong disease, treatment can help control it and help you stay healthy. Follow-up care is a key part of your treatment and safety. Be sure to make and go to all appointments, and call your doctor if you are having problems. It's also a good idea to know your test results and keep a list of the medicines you take. How can you care for yourself at home? To control asthma over the long term  Medicines  Controller medicines reduce swelling in your lungs. They also prevent asthma attacks. Take your controller medicine exactly as prescribed. Talk to your doctor if you have any problems with your medicine. · Inhaled corticosteroid is a common and effective controller medicine. Using it the right way can prevent or reduce most side effects. · Take your controller medicine every day, not just when you have symptoms. It helps prevent problems before they occur. · Your doctor may prescribe another medicine that you use along with the corticosteroid. This is often a long-acting bronchodilator. Do not take this medicine by itself. Using a long-acting bronchodilator by itself can increase your risk of a severe or fatal asthma attack. · Do not take inhaled corticosteroids or long-acting bronchodilators to stop an asthma attack that has already started. They don't work fast enough to help. · Talk to your doctor before you use other medicines. Some medicines, such as aspirin, can cause asthma attacks in some people. Education  · Learn what triggers an asthma attack. Avoid these triggers when you can. Common triggers include colds, smoke, air pollution, dust, pollen, mold, pets, cockroaches, stress, and cold air. · Check yourself for asthma symptoms to know which step to follow in your action plan. Watch for things like being short of breath, having chest tightness, coughing, and wheezing. Also notice if symptoms wake you up at night or if you get tired quickly when you exercise. · If you have a peak flow meter, use it to check how well you are breathing. It can help you know when an asthma attack is going to occur. Then you can take medicine to prevent the asthma attack or make it less severe. · Do not smoke or allow others to smoke around you. Avoid smoky places. Smoking makes asthma worse. If you need help quitting, talk to your doctor about stop-smoking programs and medicines. These can increase your chances of quitting for good. · Avoid colds and the flu. Get a pneumococcal vaccine shot. If you have had one before, ask your doctor whether you need a second dose. Get a flu vaccine every year, as soon as it's available. If you must be around people with colds or the flu, wash your hands often. To treat attacks when they occur  Use your asthma action plan when you have an attack. Your quick-relief medicine will stop an asthma attack. It relaxes the muscles that get tight around the airways.   If your doctor prescribed corticosteroid pills to use during an attack, take them as directed. They may take hours to work, but they may shorten the attack and help you breathe better. · Albuterol is an effective quick-relief inhaler. · Take your quick-relief medicine exactly as prescribed. · Always bring your asthma medicine with you when you travel. · You may need to use quick-relief medicine before you exercise. · Call your doctor if you think you are having a problem with your medicine. When should you call for help? Call 911 anytime you think you may need emergency care. For example, call if:    · You are having severe trouble breathing.    Call your doctor now or seek immediate medical care if:    · Your symptoms do not get better after you have followed your asthma action plan.     · You cough up yellow, dark brown, or bloody mucus (sputum).    Watch closely for changes in your health, and be sure to contact your doctor if:    · Your coughing and wheezing get worse.     · You need to use your quick-relief medicine on more than 2 days a week (unless it is just for exercise).     · You need help figuring out what is triggering your asthma attacks. Where can you learn more? Go to http://fany-ileana.info/. Enter Q227 in the search box to learn more about \"Controlling Your Asthma: Care Instructions. \"  Current as of: December 6, 2017  Content Version: 11.8  © 8188-4140 [employer]. Care instructions adapted under license by [employer] (which disclaims liability or warranty for this information). If you have questions about a medical condition or this instruction, always ask your healthcare professional. [place] [address] any warranty or liability for your use of this information. Learning About Nebulizers  What is a nebulizer? A nebulizer is a tool that delivers liquid medicine as a fine mist. You breathe in the medicine through a mouthpiece or face mask.  This sends the medicine directly to your airways and lungs. You breathe in the medicine for a few minutes. What is it used for? A nebulizer may be used to treat respiratory problems. These include asthma and chronic obstructive pulmonary disease (COPD). If you have asthma, it can be used with daily controller medicines or with quick-relief medicine during an attack or flare-up. A nebulizer can make inhaling medicines easier. It may be very helpful if it is hard for you to breathe or use an inhaler. How do you use a nebulizer? 1. Put the medicine into the medicine container. Be sure to measure the right amount. 2. Make sure that the container is connected to the mouthpiece or face mask. 3. Turn on the air compressor. 4. Take deep, slow breaths through the mouthpiece or mask. Hold each breath for about 2 seconds. 5. Continue breathing until the medicine is gone from the container. When the medicine is gone, there will be no more mist coming out. This may take about 10 minutes. 6. Shake the container to make sure you get all the medicine. Where can you learn more? Go to http://fany-ileana.info/. Enter S778 in the search box to learn more about \"Learning About Nebulizers. \"  Current as of: December 6, 2017  Content Version: 11.8  © 6359-9020 [employer]. Care instructions adapted under license by [employer] (which disclaims liability or warranty for this information). If you have questions about a medical condition or this instruction, always ask your healthcare professional. [place] [address] any warranty or liability for your use of this information. Shortness of Breath: Care Instructions  Your Care Instructions  Shortness of breath has many causes. Sometimes conditions such as anxiety can lead to shortness of breath. Some people get mild shortness of breath when they exercise.  Trouble breathing also can be a symptom of a serious problem, such as asthma, lung disease, emphysema, heart problems, and pneumonia. If your shortness of breath continues, you may need tests and treatment. Watch for any changes in your breathing and other symptoms. Follow-up care is a key part of your treatment and safety. Be sure to make and go to all appointments, and call your doctor if you are having problems. It's also a good idea to know your test results and keep a list of the medicines you take. How can you care for yourself at home? · Do not smoke or allow others to smoke around you. If you need help quitting, talk to your doctor about stop-smoking programs and medicines. These can increase your chances of quitting for good. · Get plenty of rest and sleep. · Take your medicines exactly as prescribed. Call your doctor if you think you are having a problem with your medicine. · Find healthy ways to deal with stress. ? Exercise daily. ? Get plenty of sleep. ? Eat regularly and well. When should you call for help? Call 911 anytime you think you may need emergency care. For example, call if:    · You have severe shortness of breath.     · You have symptoms of a heart attack. These may include:  ? Chest pain or pressure, or a strange feeling in the chest.  ? Sweating. ? Shortness of breath. ? Nausea or vomiting. ? Pain, pressure, or a strange feeling in the back, neck, jaw, or upper belly or in one or both shoulders or arms. ? Lightheadedness or sudden weakness. ? A fast or irregular heartbeat. After you call 911, the  may tell you to chew 1 adult-strength or 2 to 4 low-dose aspirin. Wait for an ambulance. Do not try to drive yourself.    Call your doctor now or seek immediate medical care if:    · Your shortness of breath gets worse or you start to wheeze.  Wheezing is a high-pitched sound when you breathe.     · You wake up at night out of breath or have to prop your head up on several pillows to breathe.     · You are short of breath after only light activity or while at rest.    Watch closely for changes in your health, and be sure to contact your doctor if:    · You do not get better over the next 1 to 2 days. Where can you learn more? Go to http://fany-ileana.info/. Enter S780 in the search box to learn more about \"Shortness of Breath: Care Instructions. \"  Current as of: December 6, 2017  Content Version: 11.8  © 1220-5332 [employer]. Care instructions adapted under license by [employer] (which disclaims liability or warranty for this information). If you have questions about a medical condition or this instruction, always ask your healthcare professional. [place] [address] any warranty or liability for your use of this information. Wheezing or Bronchoconstriction: Care Instructions  Your Care Instructions  Wheezing is a whistling noise made during breathing. It occurs when the small airways, or bronchial tubes, that lead to your lungs swell or contract (spasm) and become narrow. This narrowing is called bronchoconstriction. When your airways constrict, it is hard for air to pass through and this makes it hard for you to breathe. Wheezing and bronchoconstriction can be caused by many problems, including:  · An infection such as the flu or a cold. · Allergies such as hay fever. · Diseases such as asthma or chronic obstructive pulmonary disease. · Smoking. Treatment for your wheezing depends on what is causing the problem. Your wheezing may get better without treatment. But you may need to pay attention to things that cause your wheezing and avoid them. Or you may need medicine to help treat the wheezing and to reduce the swelling or to relieve spasms in your lungs. Follow-up care is a key part of your treatment and safety. Be sure to make and go to all appointments, and call your doctor if you are having problems.  It is also a good idea to know your test results and keep a list of the medicines you take.  How can you care for yourself at home? · Take your medicine exactly as prescribed. Call your doctor if you think you are having a problem with your medicine. You will get more details on the specific medicine your doctor prescribes. · If your doctor prescribed antibiotics, take them as directed. Do not stop taking them just because you feel better. You need to take the full course of antibiotics. · Breathe moist air from a humidifier, hot shower, or sink filled with hot water. This may help ease your symptoms and make it easier for you to breathe. · If you have congestion in your nose and throat, drinking plenty of fluids, especially hot fluids, may help relieve your symptoms. If you have kidney, heart, or liver disease and have to limit fluids, talk with your doctor before you increase the amount of fluids you drink. · If you have mucus in your airways, it may help to breathe deeply and cough. · Do not smoke or allow others to smoke around you. Smoking can make your wheezing worse. If you need help quitting, talk to your doctor about stop-smoking programs and medicines. These can increase your chances of quitting for good. · Avoid things that may cause your wheezing. These may include colds, smoke, air pollution, dust, pollen, pets, cockroaches, stress, and cold air. When should you call for help? Call 911 anytime you think you may need emergency care. For example, call if:    · You have severe trouble breathing.     · You passed out (lost consciousness).    Call your doctor now or seek immediate medical care if:    · You cough up yellow, dark brown, or bloody mucus (sputum).     · You have new or worse shortness of breath.     · Your wheezing is not getting better or it gets worse after you start taking your medicine.    Watch closely for changes in your health, and be sure to contact your doctor if:    · You do not get better as expected. Where can you learn more?   Go to http://fany-ileana.info/. Enter 454 [id number] in the search box to learn more about \"Wheezing or Bronchoconstriction: Care Instructions. \"  Current as of: December 6, 2017  Content Version: 11.8  © 6395-9720 [employer]. Care instructions adapted under license by [employer] (which disclaims liability or warranty for this information). If you have questions about a medical condition or this instruction, always ask your healthcare professional. [address] any warranty or liability for your use of this information.

## 2018-12-12 NOTE — PROGRESS NOTES
Bedside and Verbal shift change report given to Pamela RN (oncoming nurse) by Ivy Diego RN (offgoing nurse). Report included the following information SBAR, Kardex, Intake/Output, and MAR. Pt in bed watching TV.    2020  Shift assessment completed. Pt in bed watching TV.    0442  Pt in bed resting quietly, denies pain. Bedside shift change report given to Coy Dempsey RN (oncoming nurse) by Telma Johnson RN (offgoing nurse). Report included the following information SBAR, Kardex, Intake/Output, and MAR.

## 2018-12-12 NOTE — ANCILLARY DISCHARGE INSTRUCTIONS
Patient and/or next of kin has been given the [facility] Important Message From Medicare About Your Rights\" letter and all questions were answered.

## 2018-12-12 NOTE — MANAGEMENT PLAN
Discharge/Transition Planning    CM establishing a PCP for pt. Pt will d/c home with outpt follow up. Care Management Interventions  PCP Verified by CM: Yes  Transition of Care Consult (CM Consult): Discharge Planning  Current Support Network:  Other(apartment with fiance)  Confirm Follow Up Transport: Self  Plan discussed with Pt/Family/Caregiver: Yes  Discharge Location  Discharge Placement: Home  Radha Sue RN BSN  Outcomes Manager  Pager # [phone]

## 2018-12-13 NOTE — ROUTINE PROCESS
Bedside, Verbal and Written shift change report given to Kennedy Isabel RN (oncoming nurse) by Juliann Persaud RN (offgoing nurse). Report included the following information SBAR, Kardex, Intake/Output, MAR, Recent Results, Med Rec Status, Procedure Summary and Cardiac Rhythm Non-Telemetry.      0730 - Shift assessment completed. Pt alert and oriented x4. No respiratory distress noted, pt on 3 liters O2 via n/c. No c/o pain reported. Call bell within reach, bed in low position. Will continue to monitor.      1230 - Shift re-assessment completed, no change in pt condition. 1330 - Home O2 Walk Test performed with pt, and does not need Home O2. Pt ambulating on room air at 94%. 1427 - Pt coughing, PRN Tussionex administered. 1524 - Pt given flu vaccination. 1530 - I have reviewed discharge instructions with the patient. The patient verbalized understanding. Discharge medications reviewed with patient and appropriate educational materials and side effects teaching were provided. IV catheter discontinued intact. Site without signs and symptoms of complications. Dressing and pressure applied. Patient armband removed and shredded. 1630 - Shift re-assessment completed, no change in pt condition. Pt discharged to home with family. All pt belongings went with her. Pt received discharge medications from Anjel[facility] Su..

## 2018-12-14 ENCOUNTER — PATIENT OUTREACH (OUTPATIENT)
Dept: FAMILY MEDICINE CLINIC | Facility: CLINIC | Age: 38
End: 2018-12-14

## 2018-12-14 NOTE — PROGRESS NOTES
Hospital Discharge Follow-Up      Date/Time:  2018 1:32 PM    Patient was admitted to [facility]/[address] on 18 and discharged on 18 for Asthma Exacerbation. The physician discharge summary was available at the time of outreach. Patient was contacted within 2 business days of discharge. Top Challenges reviewed with the provider   None per Pt. Method of communication with provider :chart routing      Nurse Navigator (NN) contacted the patient by telephone to perform post hospital discharge assessment. Verified name and  with patient as identifiers. Provided introduction to self, and explanation of the Nurse Navigator role. Patient stated \" I'm feeling a lot better. \"  Patient denied chest pain, shortness of breath, fever, and chills. Patient states that her cough is a lot better. Patient speech and tone are normal.   No coughing noted while talking to patient. Patient can talk to NN clearly and in  full sentences. Reviewed discharge instructions and red flags with patient who verbalized understanding. Patient given an opportunity to ask questions and does not have any further questions or concerns at this time. The patient agrees to contact the PCP office for questions related to their healthcare. NN provided contact information for future reference. Home Health orders at discharge: [address]: n/a  Date of initial visit: [facility] ordered/company: n/a  Durable Medical Equipment received: n/a    Barriers to care? None noted at this time. None as per Pt. Advance Care Planning:   Does patient have an Advance Directive:  not on file     Medication(s):   New Medications at Discharge:   START taking these medications     Details   predniSONE (DELTASONE) 50 mg tablet Take 1 Tab by mouth daily (with breakfast) for 2 days.   Qty: 2 Tab, Refills: 0       albuterol (PROVENTIL HFA, VENTOLIN HFA, PROAIR HFA) 90 mcg/actuation inhaler Take 1 Puff by inhalation every four (4) hours as needed for Wheezing or Shortness of Breath. Qty: 1 Inhaler, Refills: 0               Patient states that she has completed her prednisone. Changed Medications at Discharge: none noted   Discontinued Medications at Discharge: none noted    Medication reconciliation was performed with patient, who verbalizes understanding of administration of home medications. There were no barriers to obtaining medications identified at this time. Referral to Pharm D needed: no     Current Outpatient Medications   Medication Sig    albuterol (PROVENTIL HFA, VENTOLIN HFA, PROAIR HFA) 90 mcg/actuation inhaler Take 1 Puff by inhalation every four (4) hours as needed for Wheezing or Shortness of Breath.  predniSONE (DELTASONE) 50 mg tablet Take 1 Tab by mouth daily (with breakfast) for 2 days. No current facility-administered medications for this visit. There are no discontinued medications. [facility] follow up appointment(s): No future appointments. Monday, December 17, 2018 03:30 PM    Non-[facility] follow up appointment(s): n/a  [facility]:  n/a       Goals      Prevent complications post hospitalization. Patient will attend follow up appointment with Dr. Segun Baires on Monday, December 17, 2018 03:30 PM.           Noted There are two Evelina Rodriguez in [facility] with the same name and date of birth. Noted Patient's appointment was scheduled to that other account and that's the reason why its not showing in this account.

## 2019-01-11 ENCOUNTER — PATIENT OUTREACH (OUTPATIENT)
Dept: FAMILY MEDICINE CLINIC | Facility: CLINIC | Age: 39
End: 2019-01-11

## 2019-01-11 NOTE — PROGRESS NOTES
Hospital Discharge Follow-Up Date/Time:  1/11/2019 1:12 PM 
 
Patient was admitted to [facility]/[address] on 12/8/18 and discharged on 12/12/18 for Asthma Exacerbation. Attempt to contact patient via telephone on 1/11/19 for follow up and to offer appointment. Unable to reach. Left message on voicemail with office contact information. No Patient medical information left on message. Noted No show appointment with Dr. Bessie Spencer on 12/17/18.

## 2019-02-01 ENCOUNTER — PATIENT OUTREACH (OUTPATIENT)
Dept: FAMILY MEDICINE CLINIC | Facility: CLINIC | Age: 39
End: 2019-02-01

## 2019-02-01 NOTE — PROGRESS NOTES
Follow-Up Date/Time:  2/1/2019 2:47 PM 
 
Patient was admitted to [facility]/[address] on 12/8/18 and discharged on 12/12/18 for Asthma Exacerbation. Attempt to contact patient via telephone on 2/1/19 for follow up, Unable to reach. Left message on voicemail with office contact information. No Patient medical information left on message. Patient has graduated from the Transitions of Care Coordination  program on 2/1/19. No further nurse navigator follow up scheduled. Noted no hospitalization admission post 30 days from discharge date 12/12/18. This episode is closed. Post Hospitalization Encounter Resolved.

## 2019-11-13 NOTE — PROGRESS NOTES
Internal Medicine Progress Note Patient's Name: Baldomero Siemens Admit Date: 12/8/2018 Length of Stay: 2 Assessment/Plan Active Hospital Problems Diagnosis Date Noted  Acute hypoxemic respiratory failure ([address] 12/10/2018  Asthma exacerbation 12/08/2018 Persistent asthma exacerbation with hypoxic respiratory failure - Cont duonebs, change to q4h 
- Cont steroids - Mucinex/cough suppressant PRN 
- Titrate off O2 as able - Cont acceptable home medications for chronic conditions  
- DVT protocol I have personally reviewed all pertinent labs and films that have officially resulted over the last 24 hours. I have personally checked for all pending labs that are awaiting final results. Subjective Pt s/e @ bedside No major events overnight Feeling a little better, but still very wheezy and mild SOB Denies CP Objective Visit Vitals /47 Pulse 91 Temp 98.3 °F (36.8 °C) Resp 20 Ht 5' 9\" (1.753 m) Wt 99.8 kg (220 lb 0.3 oz) SpO2 93% BMI 32.49 kg/m² Physical Exam: 
General Appearance: NAD, conversant Lungs: Decreased BS B/L w/ diffuse exp wheeze CV: RRR, no m/r/g Abdomen: soft, non-tender, normal bowel sounds Extremities: no cyanosis, no peripheral edema Neuro: No focal deficits, motor/sensory intact Lab/Data Reviewed: 
BMP: No results found for: NA, K, CL, CO2, AGAP, GLU, BUN, CREA, GFRAA, GFRNA 
CBC: No results found for: WBC, HGB, HGBEXT, HCT, HCTEXT, PLT, PLTEXT, HGBEXT, HCTEXT, PLTEXT Imaging Reviewed: 
No results found. Medications Reviewed: 
Current Facility-Administered Medications Medication Dose Route Frequency  chlorpheniramine-HYDROcodone (TUSSIONEX) oral suspension 5 mL  5 mL Oral Q12H PRN  
 albuterol-ipratropium (DUO-NEB) 2.5 MG-0.5 MG/3 ML  3 mL Nebulization Q4H PRN  
 guaiFENesin ER (MUCINEX) tablet 1,200 mg  1,200 mg Oral BID  acetaminophen (TYLENOL) tablet 650 mg  650 mg Oral Q4H PRN  
  ondansetron (ZOFRAN) injection 4 mg  4 mg IntraVENous Q4H PRN  
 albuterol-ipratropium (DUO-NEB) 2.5 MG-0.5 MG/3 ML  3 mL Nebulization QID RT  
 predniSONE (DELTASONE) tablet 50 mg  50 mg Oral DAILY WITH BREAKFAST  nicotine (NICODERM CQ) 14 mg/24 hr patch 1 Patch  1 Patch TransDERmal DAILY  famotidine (PEPCID) tablet 20 mg  20 mg Oral DAILY Jw Moncada DO Internal Medicine, Hospitalist 
Pager: [phone] Detail Level: Zone Detail Level: Detailed Show Specific Providers When Referring To Others For Closure?: Yes

## 2021-01-13 PROBLEM — A41.9 SEPSIS (HCC): Status: ACTIVE | Noted: 2021-01-13

## 2021-01-13 PROBLEM — N20.1 LEFT URETERAL STONE: Status: ACTIVE | Noted: 2021-01-13

## 2021-01-13 PROBLEM — N30.00 ACUTE CYSTITIS: Status: ACTIVE | Noted: 2021-01-13

## 2021-03-11 PROBLEM — Z96.0 URETERAL STENT RETAINED: Status: ACTIVE | Noted: 2021-03-11

## 2021-03-11 PROBLEM — N39.0 UTI (URINARY TRACT INFECTION): Status: ACTIVE | Noted: 2021-03-11

## 2021-03-11 PROBLEM — N20.1 URETERAL STONE: Status: ACTIVE | Noted: 2021-03-11

## 2021-03-17 ENCOUNTER — PATIENT OUTREACH (OUTPATIENT)
Dept: CASE MANAGEMENT | Age: 41
End: 2021-03-17

## 2021-03-17 NOTE — PROGRESS NOTES
Care Transitions Initial Follow Up Call      Patient: Cassidy Valles Patient : 1980 MRN: [id number]    Last Discharge [address]       Complaint Diagnosis Description Type Department Provider    3/11/21 Urinary Frequency; Flank Pain Urinary tract infection without hematuria, site unspecified . .. ED to Hosp-Admission (Discharged) (ADMIT) Danielle Albrecht, Marleen Andres MD; Ainsley Woodard... CTN Attempt to contact patient via telephone on 3/17/21 for post hospital  follow up. Unable to reach. Left message on voicemail with office contact information. No Patient medical information left on message.     [facility] follow up appointment(s):   Future Appointments   Date Time Provider Stephan Nicole   3/18/2021 11:30 AM MD LANCE Quintanilla AMB

## 2021-03-18 ENCOUNTER — PATIENT OUTREACH (OUTPATIENT)
Dept: CASE MANAGEMENT | Age: 41
End: 2021-03-18

## 2021-03-18 NOTE — PROGRESS NOTES
Care Transitions Initial Follow Up Call      Patient: Kermit Holland Patient : 1980 MRN: [id number]    Last Discharge [address]       Complaint Diagnosis Description Type Department Provider    3/11/21 Urinary Frequency; Flank Pain Urinary tract infection without hematuria, site unspecified . .. ED to Hosp-Admission (Discharged) (ADMIT) Pooja Carrion MD; Marshal Farris... CTN Attempt to contact patient via telephone on 3/18/21 for post hospital  follow up. Unable to reach. Left message on voicemail with office contact information. No Patient medical information left on message.     [facility] follow up appointment(s):   Future Appointments   Date Time Provider Stephan Nicole   3/29/2021  2:00 PM Roverto Lawrence PA-C [facility]

## 2021-03-31 ENCOUNTER — PATIENT OUTREACH (OUTPATIENT)
Dept: CASE MANAGEMENT | Age: 41
End: 2021-03-31

## 2021-03-31 NOTE — PROGRESS NOTES
CTN reached Patient on phone. CTN introduced self role and purpose of call. Patient verified her identity. Patient reported that she is not doing well at all. Patient reported that she just spoke with urology and she was prescribed an antibiotic by mouth. Pt. Stated that she feels that her oral antibiotic is not working for her anymore. Pt. States that she has been on antibiotic for a while and she has been dealing with this issues for two months now. Patient states that she doesn't want to call her PCP because her symptoms are worsening and that she is going  back to ED today. Pt. Stated \" My pain is so severe and I can't really pee. \"   Patient states that her Urology Doctor is aware and she informed them as well that she is going back to ED. Patient thanked us for follow up call and kept the conversation short and ended the call.

## 2021-04-16 ENCOUNTER — PATIENT OUTREACH (OUTPATIENT)
Dept: CASE MANAGEMENT | Age: 41
End: 2021-04-16

## 2021-04-16 NOTE — PROGRESS NOTES
Patient has graduated from the Transitions of Care Coordination  program on 4/16/21. Attempt to contact patient via telephone on 4/16/21 for follow up and to offer apt. With PCP, Unable to reach. Left message on voicemail with office contact information. No Patient medical information left on message. No further Care Transition Nurse follow up scheduled. This episode is closed. Post Hospitalization Encounter Resolved.

## 2022-04-25 NOTE — DISCHARGE SUMMARY
Internal Medicine Discharge Summary        Patient: Marianna London    YOB: 1980    Age:  41 y.o. Admit Date: 12/8/2018    Discharge Date: 12/12/2018    LOS:  LOS: 4 days     Discharge To: Home    Consults: Pulmonary/Critical Care    Admission Diagnoses: Asthma exacerbation  Asthma exacerbation    Discharge Diagnoses:    Problem List as of 12/12/2018 Never Reviewed          Codes Class Noted - Resolved    Acute hypoxemic respiratory failure [facility] ICD-10-CM: J96.01  ICD-9-CM: 518.81  12/10/2018 - Present        * (Principal) Asthma exacerbation ICD-10-CM: J45.901  ICD-9-CM: 493.92  12/8/2018 - Present              Discharge Condition:  Improved    Procedures: None         HPI: Marianna London is a 41 y.o. female with a PMHx of who presented to the ED with SOB and intermittent cough. She has a hx of asthma and she had wheezing for the last 2d. Today sx got worse and she was noted by miguel angel to have perioral cyanosis and increased respiratory effort. She was brought to ED.     ED evaluation revealed a patient with asthma exacerbation, CXR negative, labs unremarkable, response to nebs, Solu-Medrol and Mg sulphate suboptimal with persistent wheezing and still increased respiratory effort. Will admit for further treatment and observation. Hospital Course: The patient was admitted to the unit and initially treated with BiPAP along with inhaled beta agonists/anticholinergics. She avoided need for intubated. She was also treated with steroids. She improved and was able to come off of the BiPAP. She was then transferred to [facility] and improved slowly over the next few days on nebulizer treatments. She also improved with mucinex/cough suppressant and chest PT/acapella. The patient had ambulatory O2 done and did not become hypoxic. She will continue with steroids at discharge for an additional 2 days.  The rest of the patient's chronic conditions were managed appropriately during their admission. They were medically stable at the time of discharge. Visit Vitals  /61 (BP 1 Location: Left arm, BP Patient Position: Head of bed elevated (Comment degrees))   Pulse 72   Temp 98.2 °F (36.8 °C)   Resp 18   Ht 5' 9\" (1.753 m)   Wt 99.8 kg (220 lb 0.3 oz)   SpO2 94%   BMI 32.49 kg/m²       Physical Exam at Discharge:  General Appearance: NAD, conversant  HENT: normocephalic/atraumatic, moist mucus membranes  Lungs: CTA with normal respiratory effort  CV: RRR, no m/r/g  Abdomen: soft, non-tender, normal bowel sounds  Extremities: no cyanosis, no peripheral edema  Neuro: moves all extremities, no focal deficits  Psych: appropriate affect, alert and oriented to person, place and time    Labs Prior to Discharge:  Labs: Results:       Chemistry No results for input(s): GLU, NA, K, CL, CO2, BUN, CREA, CA, AGAP, BUCR, TBIL, GPT, AP, TP, ALB, GLOB, AGRAT in the last 72 hours. CBC w/Diff No results for input(s): WBC, RBC, HGB, HCT, PLT, GRANS, LYMPH, EOS, HGBEXT, HCTEXT, PLTEXT in the last 72 hours. Cardiac Enzymes No results for input(s): CPK, CKND1, RICKY in the last 72 hours. No lab exists for component: CKRMB, TROIP   Coagulation No results for input(s): PTP, INR, APTT in the last 72 hours. No lab exists for component: INREXT    Lipid Panel No results found for: CHOL, CHOLPOCT, CHOLX, CHLST, CHOLV, 605613, HDL, LDL, LDLC, DLDLP, 427398, VLDLC, VLDL, TGLX, TRIGL, TRIGP, TGLPOCT, CHHD, CHHDX   BNP No results for input(s): BNPP in the last 72 hours. Liver Enzymes No results for input(s): TP, ALB, TBIL, AP, SGOT, GPT in the last 72 hours.     No lab exists for component: DBIL   Thyroid Studies No results found for: T4, T3U, TSH, TSHEXT         Significant Imaging:  Xr Chest Port    Result Date: 12/8/2018  EXAM: CHEST RADIOGRAPH CLINICAL INDICATION/HISTORY: SOB -Additional: Wheezing COMPARISON: None TECHNIQUE: Portable frontal view of the chest _______________ FINDINGS: SUPPORT DEVICES: Patient status post sternotomy. HEART AND MEDIASTINUM: Heart size normal. There is a density in the right cardiophrenic angle. This could represent a prominent cardiophrenic fat pad. LUNGS AND PLEURAL SPACES: Clear. No consolidation, mass or effusion. BONY THORAX AND SOFT TISSUES: Unremarkable. _______________     IMPRESSION: No active cardiopulmonary disease. Discharge Medications:     Current Discharge Medication List      START taking these medications    Details   predniSONE (DELTASONE) 50 mg tablet Take 1 Tab by mouth daily (with breakfast) for 2 days. Qty: 2 Tab, Refills: 0      albuterol (PROVENTIL HFA, VENTOLIN HFA, PROAIR HFA) 90 mcg/actuation inhaler Take 1 Puff by inhalation every four (4) hours as needed for Wheezing or Shortness of Breath. Qty: 1 Inhaler, Refills: 0             Activity: Activity as tolerated    Diet: Resume previous diet    Wound Care: None needed    Follow-up:   Please follow up with your PCP within 7 days to discuss your recent hospitalization. Patient to arrange.          Total time spent including time spent on final examination and discharge discussion, discharge documentation and records reviewed and medication reconciliation: > 30 minutes    Charlotte Yuan DO  Internal Medicine, Hospitalist  Pager: [phone] none